# Patient Record
Sex: FEMALE | Race: OTHER | HISPANIC OR LATINO
[De-identification: names, ages, dates, MRNs, and addresses within clinical notes are randomized per-mention and may not be internally consistent; named-entity substitution may affect disease eponyms.]

---

## 2019-02-13 ENCOUNTER — APPOINTMENT (OUTPATIENT)
Dept: UROLOGY | Facility: CLINIC | Age: 59
End: 2019-02-13
Payer: MEDICAID

## 2019-02-13 VITALS
BODY MASS INDEX: 32.49 KG/M2 | TEMPERATURE: 98.6 F | HEIGHT: 65 IN | DIASTOLIC BLOOD PRESSURE: 70 MMHG | OXYGEN SATURATION: 99 % | SYSTOLIC BLOOD PRESSURE: 150 MMHG | WEIGHT: 195 LBS | HEART RATE: 85 BPM

## 2019-02-13 DIAGNOSIS — D35.02 BENIGN NEOPLASM OF LEFT ADRENAL GLAND: ICD-10-CM

## 2019-02-13 DIAGNOSIS — Z86.79 PERSONAL HISTORY OF OTHER DISEASES OF THE CIRCULATORY SYSTEM: ICD-10-CM

## 2019-02-13 DIAGNOSIS — N28.89 OTHER SPECIFIED DISORDERS OF KIDNEY AND URETER: ICD-10-CM

## 2019-02-13 DIAGNOSIS — Z86.39 PERSONAL HISTORY OF OTHER ENDOCRINE, NUTRITIONAL AND METABOLIC DISEASE: ICD-10-CM

## 2019-02-13 PROBLEM — Z00.00 ENCOUNTER FOR PREVENTIVE HEALTH EXAMINATION: Status: ACTIVE | Noted: 2019-02-13

## 2019-02-13 PROCEDURE — 99205 OFFICE O/P NEW HI 60 MIN: CPT | Mod: 57

## 2019-02-13 RX ORDER — OMEGA-3/DHA/EPA/FISH OIL 300-1000MG
1000 CAPSULE ORAL
Qty: 60 | Refills: 0 | Status: ACTIVE | COMMUNITY
Start: 2018-09-19

## 2019-02-13 RX ORDER — DORZOLAMIDE HYDROCHLORIDE TIMOLOL MALEATE 20; 5 MG/ML; MG/ML
22.3-6.8 SOLUTION/ DROPS OPHTHALMIC
Qty: 10 | Refills: 0 | Status: ACTIVE | COMMUNITY
Start: 2018-09-07

## 2019-02-13 RX ORDER — DILTIAZEM HYDROCHLORIDE 360 MG/1
360 CAPSULE, EXTENDED RELEASE ORAL
Qty: 30 | Refills: 0 | Status: ACTIVE | COMMUNITY
Start: 2018-09-03

## 2019-02-13 RX ORDER — NORMAL SALT TABLETS 1 G/G
1 TABLET ORAL
Qty: 9 | Refills: 0 | Status: ACTIVE | COMMUNITY
Start: 2018-12-18

## 2019-02-13 RX ORDER — IBUPROFEN 600 MG/1
600 TABLET, FILM COATED ORAL
Qty: 21 | Refills: 0 | Status: ACTIVE | COMMUNITY
Start: 2018-12-03

## 2019-02-13 RX ORDER — SIMVASTATIN 10 MG/1
10 TABLET, FILM COATED ORAL
Qty: 30 | Refills: 0 | Status: ACTIVE | COMMUNITY
Start: 2018-09-03

## 2019-02-13 RX ORDER — AMOXICILLIN 500 MG/1
500 CAPSULE ORAL
Qty: 21 | Refills: 0 | Status: ACTIVE | COMMUNITY
Start: 2018-12-03

## 2019-02-13 RX ORDER — FLUTICASONE PROPIONATE 110 UG/1
110 AEROSOL, METERED RESPIRATORY (INHALATION)
Qty: 12 | Refills: 0 | Status: ACTIVE | COMMUNITY
Start: 2019-02-02

## 2019-02-13 RX ORDER — BRIMONIDINE TARTRATE 2 MG/MG
0.2 SOLUTION/ DROPS OPHTHALMIC
Qty: 5 | Refills: 0 | Status: ACTIVE | COMMUNITY
Start: 2018-09-07

## 2019-02-13 RX ORDER — METFORMIN HYDROCHLORIDE 500 MG/1
500 TABLET, COATED ORAL
Qty: 60 | Refills: 0 | Status: ACTIVE | COMMUNITY
Start: 2019-02-02

## 2019-02-13 RX ORDER — LATANOPROST/PF 0.005 %
0.01 DROPS OPHTHALMIC (EYE)
Qty: 2 | Refills: 0 | Status: ACTIVE | COMMUNITY
Start: 2018-09-19

## 2019-02-13 RX ORDER — ERGOCALCIFEROL 1.25 MG/1
1.25 MG CAPSULE, LIQUID FILLED ORAL
Qty: 4 | Refills: 0 | Status: ACTIVE | COMMUNITY
Start: 2018-09-03

## 2019-02-13 RX ORDER — DEXAMETHASONE 1 MG/1
1 TABLET ORAL
Qty: 1 | Refills: 0 | Status: ACTIVE | COMMUNITY
Start: 2018-11-13

## 2019-02-13 RX ORDER — SIMVASTATIN 20 MG/1
20 TABLET, FILM COATED ORAL
Qty: 30 | Refills: 0 | Status: ACTIVE | COMMUNITY
Start: 2019-01-14

## 2019-02-13 RX ORDER — ALBUTEROL SULFATE 90 UG/1
108 (90 BASE) AEROSOL, METERED RESPIRATORY (INHALATION)
Qty: 18 | Refills: 0 | Status: ACTIVE | COMMUNITY
Start: 2019-02-02

## 2019-02-13 RX ORDER — FUROSEMIDE 20 MG/1
20 TABLET ORAL
Qty: 30 | Refills: 0 | Status: ACTIVE | COMMUNITY
Start: 2018-09-07

## 2019-02-14 PROBLEM — N28.89 LEFT RENAL MASS: Status: ACTIVE | Noted: 2019-02-14

## 2019-02-14 PROBLEM — Z86.39 HISTORY OF HYPERLIPIDEMIA: Status: RESOLVED | Noted: 2019-02-14 | Resolved: 2019-02-14

## 2019-02-14 PROBLEM — Z86.79 HISTORY OF HYPERTENSION: Status: RESOLVED | Noted: 2019-02-14 | Resolved: 2019-02-14

## 2019-02-14 PROBLEM — D35.02 ADRENAL ADENOMA, LEFT: Status: ACTIVE | Noted: 2019-02-14

## 2019-02-14 NOTE — PHYSICAL EXAM
[General Appearance - In No Acute Distress] : no acute distress [Edema] : no peripheral edema [Respiration, Rhythm And Depth] : normal respiratory rhythm and effort [Exaggerated Use Of Accessory Muscles For Inspiration] : no accessory muscle use [Abdomen Soft] : soft [Abdomen Tenderness] : non-tender [Costovertebral Angle Tenderness] : no ~M costovertebral angle tenderness [Normal Station and Gait] : the gait and station were normal for the patient's age [] : no rash [No Focal Deficits] : no focal deficits [Oriented To Time, Place, And Person] : oriented to person, place, and time [Affect] : the affect was normal [Mood] : the mood was normal [Not Anxious] : not anxious [FreeTextEntry1] : BMI = 32

## 2019-02-14 NOTE — ASSESSMENT
[FreeTextEntry1] : 59yo female with left adrenal adenoma and left renal mass\par CT imaging reviewed\par Patient also has non-obstructing right renal stone which can be observed for now\par Options reviewed including observation of renal mass, deferred evaluation including biopsy, or partial vs radical nephrectomy\par She is interested in partial nephrectomy at time of adrenalectomy\par Risks of surgery including transfusion, urinoma, conversion to open, conversion to radical nephrectomy discussed\par Will review outside labs and staging workup by Dr. Bates

## 2019-02-14 NOTE — HISTORY OF PRESENT ILLNESS
[None] : no symptoms [Lower Back] : lower back [FreeTextEntry1] : This is a 57yo female referred by Dr. Bates for incidental left renal mass. This was found during workup of a 5cm left adrenal mass. The adrenal mass is reportedly non-functioning but she is scheduled for robotic adrenalectomy next week due to the large size. Also noted to have 3cm enhancing left renal mass. Here to discuss possible treatment of this mass at the time of adrenalectomy. No history of smoking or hematuria.

## 2019-02-14 NOTE — LETTER BODY
[Dear  ___] : Dear  [unfilled], [Courtesy Letter:] : I had the pleasure of seeing your patient, [unfilled], in my office today. [Please see my note below.] : Please see my note below. [Consult Closing:] : Thank you very much for allowing me to participate in the care of this patient.  If you have any questions, please do not hesitate to contact me. [Sincerely,] : Sincerely, [FreeTextEntry2] : Neville Martin MD\par [address]\[place] [address] [FreeTextEntry3] : Donis Powell MD\par Urologic Oncology\par Department of Urology\par [facility]\par \par [employer] [facility]  [DrShaye  ___] : Dr. HELMS

## 2019-02-18 VITALS
RESPIRATION RATE: 16 BRPM | HEIGHT: 60 IN | DIASTOLIC BLOOD PRESSURE: 68 MMHG | WEIGHT: 197.75 LBS | HEART RATE: 81 BPM | SYSTOLIC BLOOD PRESSURE: 139 MMHG | OXYGEN SATURATION: 98 % | TEMPERATURE: 98 F

## 2019-03-05 ENCOUNTER — INPATIENT (INPATIENT)
Facility: HOSPITAL | Age: 59
LOS: 3 days | Discharge: HOME HEALTH | DRG: 658 | End: 2019-03-09
Attending: UROLOGY | Admitting: UROLOGY
Payer: COMMERCIAL

## 2019-03-05 ENCOUNTER — RESULT REVIEW (OUTPATIENT)
Age: 59
End: 2019-03-05

## 2019-03-05 DIAGNOSIS — Z98.891 HISTORY OF UTERINE SCAR FROM PREVIOUS SURGERY: Chronic | ICD-10-CM

## 2019-03-05 DIAGNOSIS — Z90.711 ACQUIRED ABSENCE OF UTERUS WITH REMAINING CERVICAL STUMP: Chronic | ICD-10-CM

## 2019-03-05 DIAGNOSIS — Z98.890 OTHER SPECIFIED POSTPROCEDURAL STATES: Chronic | ICD-10-CM

## 2019-03-05 LAB
ANION GAP SERPL CALC-SCNC: 13 MMOL/L — SIGNIFICANT CHANGE UP (ref 5–17)
ANION GAP SERPL CALC-SCNC: 13 MMOL/L — SIGNIFICANT CHANGE UP (ref 5–17)
BASE EXCESS BLDA CALC-SCNC: -5.1 MMOL/L — LOW (ref -2–3)
BUN SERPL-MCNC: 17 MG/DL — SIGNIFICANT CHANGE UP (ref 7–23)
BUN SERPL-MCNC: 19 MG/DL — SIGNIFICANT CHANGE UP (ref 7–23)
CA-I BLDA-SCNC: 1.09 MMOL/L — LOW (ref 1.12–1.3)
CALCIUM SERPL-MCNC: 8.7 MG/DL — SIGNIFICANT CHANGE UP (ref 8.4–10.5)
CALCIUM SERPL-MCNC: 9.2 MG/DL — SIGNIFICANT CHANGE UP (ref 8.4–10.5)
CHLORIDE SERPL-SCNC: 101 MMOL/L — SIGNIFICANT CHANGE UP (ref 96–108)
CHLORIDE SERPL-SCNC: 103 MMOL/L — SIGNIFICANT CHANGE UP (ref 96–108)
CO2 SERPL-SCNC: 24 MMOL/L — SIGNIFICANT CHANGE UP (ref 22–31)
CO2 SERPL-SCNC: 26 MMOL/L — SIGNIFICANT CHANGE UP (ref 22–31)
COHGB MFR BLDA: 0 % — SIGNIFICANT CHANGE UP
CREAT SERPL-MCNC: 0.69 MG/DL — SIGNIFICANT CHANGE UP (ref 0.5–1.3)
CREAT SERPL-MCNC: 0.8 MG/DL — SIGNIFICANT CHANGE UP (ref 0.5–1.3)
GLUCOSE BLDC GLUCOMTR-MCNC: 106 MG/DL — HIGH (ref 70–99)
GLUCOSE BLDC GLUCOMTR-MCNC: 145 MG/DL — HIGH (ref 70–99)
GLUCOSE BLDC GLUCOMTR-MCNC: 154 MG/DL — HIGH (ref 70–99)
GLUCOSE BLDC GLUCOMTR-MCNC: 164 MG/DL — HIGH (ref 70–99)
GLUCOSE SERPL-MCNC: 169 MG/DL — HIGH (ref 70–99)
GLUCOSE SERPL-MCNC: 173 MG/DL — HIGH (ref 70–99)
HCO3 BLDA-SCNC: 20 MMOL/L — LOW (ref 21–28)
HCT VFR BLD CALC: 31.7 % — LOW (ref 34.5–45)
HGB BLD-MCNC: 10.2 G/DL — LOW (ref 11.5–15.5)
HGB BLDA-MCNC: 10.1 G/DL — LOW (ref 11.5–15.5)
MAGNESIUM SERPL-MCNC: 1.7 MG/DL — SIGNIFICANT CHANGE UP (ref 1.6–2.6)
MAGNESIUM SERPL-MCNC: 1.8 MG/DL — SIGNIFICANT CHANGE UP (ref 1.6–2.6)
MCHC RBC-ENTMCNC: 28.7 PG — SIGNIFICANT CHANGE UP (ref 27–34)
MCHC RBC-ENTMCNC: 32.2 GM/DL — SIGNIFICANT CHANGE UP (ref 32–36)
MCV RBC AUTO: 89 FL — SIGNIFICANT CHANGE UP (ref 80–100)
METHGB MFR BLDA: 0.4 % — SIGNIFICANT CHANGE UP
NRBC # BLD: 0 /100 WBCS — SIGNIFICANT CHANGE UP (ref 0–0)
O2 CT VFR BLDA CALC: 14.4 ML/DL — SIGNIFICANT CHANGE UP (ref 15–23)
OXYHGB MFR BLDA: 98 % — SIGNIFICANT CHANGE UP (ref 94–100)
PCO2 BLDA: 40 MMHG — SIGNIFICANT CHANGE UP (ref 32–45)
PH BLDA: 7.32 — LOW (ref 7.35–7.45)
PHOSPHATE SERPL-MCNC: 5.2 MG/DL — HIGH (ref 2.5–4.5)
PHOSPHATE SERPL-MCNC: 6.5 MG/DL — HIGH (ref 2.5–4.5)
PLATELET # BLD AUTO: 290 K/UL — SIGNIFICANT CHANGE UP (ref 150–400)
PO2 BLDA: 178 MMHG — HIGH (ref 83–108)
POTASSIUM BLDA-SCNC: 4.2 MMOL/L — SIGNIFICANT CHANGE UP (ref 3.5–4.9)
POTASSIUM SERPL-MCNC: 4.5 MMOL/L — SIGNIFICANT CHANGE UP (ref 3.5–5.3)
POTASSIUM SERPL-MCNC: SIGNIFICANT CHANGE UP MMOL/L (ref 3.5–5.3)
POTASSIUM SERPL-SCNC: 4.5 MMOL/L — SIGNIFICANT CHANGE UP (ref 3.5–5.3)
POTASSIUM SERPL-SCNC: SIGNIFICANT CHANGE UP MMOL/L (ref 3.5–5.3)
RBC # BLD: 3.56 M/UL — LOW (ref 3.8–5.2)
RBC # FLD: 14.6 % — HIGH (ref 10.3–14.5)
SAO2 % BLDA: 99 % — SIGNIFICANT CHANGE UP (ref 95–100)
SODIUM BLDA-SCNC: 137 MMOL/L — LOW (ref 138–146)
SODIUM SERPL-SCNC: 140 MMOL/L — SIGNIFICANT CHANGE UP (ref 135–145)
SODIUM SERPL-SCNC: 140 MMOL/L — SIGNIFICANT CHANGE UP (ref 135–145)
WBC # BLD: 27.96 K/UL — HIGH (ref 3.8–10.5)
WBC # FLD AUTO: 27.96 K/UL — HIGH (ref 3.8–10.5)

## 2019-03-05 PROCEDURE — 76998 US GUIDE INTRAOP: CPT | Mod: 26

## 2019-03-05 PROCEDURE — 71045 X-RAY EXAM CHEST 1 VIEW: CPT | Mod: 26

## 2019-03-05 PROCEDURE — 50543 LAPARO PARTIAL NEPHRECTOMY: CPT | Mod: LT

## 2019-03-05 PROCEDURE — S2900 ROBOTIC SURGICAL SYSTEM: CPT

## 2019-03-05 RX ORDER — CEFAZOLIN SODIUM 1 G
2000 VIAL (EA) INJECTION EVERY 8 HOURS
Qty: 0 | Refills: 0 | Status: DISCONTINUED | OUTPATIENT
Start: 2019-03-05 | End: 2019-03-05

## 2019-03-05 RX ORDER — HEPARIN SODIUM 5000 [USP'U]/ML
7500 INJECTION INTRAVENOUS; SUBCUTANEOUS EVERY 8 HOURS
Qty: 0 | Refills: 0 | Status: DISCONTINUED | OUTPATIENT
Start: 2019-03-05 | End: 2019-03-09

## 2019-03-05 RX ORDER — INSULIN LISPRO 100/ML
VIAL (ML) SUBCUTANEOUS
Qty: 0 | Refills: 0 | Status: DISCONTINUED | OUTPATIENT
Start: 2019-03-05 | End: 2019-03-09

## 2019-03-05 RX ORDER — SIMVASTATIN 20 MG/1
1 TABLET, FILM COATED ORAL
Qty: 0 | Refills: 0 | COMMUNITY

## 2019-03-05 RX ORDER — METOCLOPRAMIDE HCL 10 MG
10 TABLET ORAL ONCE
Qty: 0 | Refills: 0 | Status: COMPLETED | OUTPATIENT
Start: 2019-03-05 | End: 2019-03-05

## 2019-03-05 RX ORDER — SIMVASTATIN 20 MG/1
20 TABLET, FILM COATED ORAL AT BEDTIME
Qty: 0 | Refills: 0 | Status: DISCONTINUED | OUTPATIENT
Start: 2019-03-05 | End: 2019-03-09

## 2019-03-05 RX ORDER — LATANOPROST 0.05 MG/ML
1 SOLUTION/ DROPS OPHTHALMIC; TOPICAL AT BEDTIME
Qty: 0 | Refills: 0 | Status: DISCONTINUED | OUTPATIENT
Start: 2019-03-05 | End: 2019-03-09

## 2019-03-05 RX ORDER — ONDANSETRON 8 MG/1
4 TABLET, FILM COATED ORAL EVERY 6 HOURS
Qty: 0 | Refills: 0 | Status: DISCONTINUED | OUTPATIENT
Start: 2019-03-05 | End: 2019-03-09

## 2019-03-05 RX ORDER — HYDROMORPHONE HYDROCHLORIDE 2 MG/ML
0.5 INJECTION INTRAMUSCULAR; INTRAVENOUS; SUBCUTANEOUS
Qty: 0 | Refills: 0 | Status: DISCONTINUED | OUTPATIENT
Start: 2019-03-05 | End: 2019-03-05

## 2019-03-05 RX ORDER — OXYCODONE AND ACETAMINOPHEN 5; 325 MG/1; MG/1
2 TABLET ORAL EVERY 6 HOURS
Qty: 0 | Refills: 0 | Status: DISCONTINUED | OUTPATIENT
Start: 2019-03-05 | End: 2019-03-09

## 2019-03-05 RX ORDER — DOCUSATE SODIUM 100 MG
100 CAPSULE ORAL THREE TIMES A DAY
Qty: 0 | Refills: 0 | Status: DISCONTINUED | OUTPATIENT
Start: 2019-03-05 | End: 2019-03-09

## 2019-03-05 RX ORDER — ACETAMINOPHEN 500 MG
650 TABLET ORAL EVERY 6 HOURS
Qty: 0 | Refills: 0 | Status: DISCONTINUED | OUTPATIENT
Start: 2019-03-05 | End: 2019-03-09

## 2019-03-05 RX ORDER — SENNA PLUS 8.6 MG/1
2 TABLET ORAL AT BEDTIME
Qty: 0 | Refills: 0 | Status: DISCONTINUED | OUTPATIENT
Start: 2019-03-05 | End: 2019-03-09

## 2019-03-05 RX ORDER — BRIMONIDINE TARTRATE 2 MG/MG
1 SOLUTION/ DROPS OPHTHALMIC THREE TIMES A DAY
Qty: 0 | Refills: 0 | Status: DISCONTINUED | OUTPATIENT
Start: 2019-03-05 | End: 2019-03-09

## 2019-03-05 RX ORDER — BRIMONIDINE TARTRATE 2 MG/MG
1 SOLUTION/ DROPS OPHTHALMIC
Qty: 0 | Refills: 0 | COMMUNITY

## 2019-03-05 RX ORDER — DILTIAZEM HCL 120 MG
360 CAPSULE, EXT RELEASE 24 HR ORAL DAILY
Qty: 0 | Refills: 0 | Status: DISCONTINUED | OUTPATIENT
Start: 2019-03-05 | End: 2019-03-09

## 2019-03-05 RX ORDER — SODIUM CHLORIDE 9 MG/ML
1000 INJECTION INTRAMUSCULAR; INTRAVENOUS; SUBCUTANEOUS
Qty: 0 | Refills: 0 | Status: DISCONTINUED | OUTPATIENT
Start: 2019-03-05 | End: 2019-03-07

## 2019-03-05 RX ORDER — FUROSEMIDE 40 MG
1 TABLET ORAL
Qty: 0 | Refills: 0 | COMMUNITY

## 2019-03-05 RX ORDER — DILTIAZEM HCL 120 MG
1 CAPSULE, EXT RELEASE 24 HR ORAL
Qty: 0 | Refills: 0 | COMMUNITY

## 2019-03-05 RX ORDER — MAGNESIUM SULFATE 500 MG/ML
1 VIAL (ML) INJECTION ONCE
Qty: 0 | Refills: 0 | Status: COMPLETED | OUTPATIENT
Start: 2019-03-05 | End: 2019-03-05

## 2019-03-05 RX ORDER — CEFAZOLIN SODIUM 1 G
2000 VIAL (EA) INJECTION EVERY 8 HOURS
Qty: 0 | Refills: 0 | Status: COMPLETED | OUTPATIENT
Start: 2019-03-05 | End: 2019-03-06

## 2019-03-05 RX ORDER — OXYCODONE AND ACETAMINOPHEN 5; 325 MG/1; MG/1
1 TABLET ORAL EVERY 4 HOURS
Qty: 0 | Refills: 0 | Status: DISCONTINUED | OUTPATIENT
Start: 2019-03-05 | End: 2019-03-09

## 2019-03-05 RX ADMIN — Medication 10 MILLIGRAM(S): at 18:30

## 2019-03-05 RX ADMIN — Medication 2: at 22:15

## 2019-03-05 RX ADMIN — Medication 360 MILLIGRAM(S): at 18:30

## 2019-03-05 RX ADMIN — Medication 100 GRAM(S): at 18:24

## 2019-03-05 RX ADMIN — HYDROMORPHONE HYDROCHLORIDE 0.5 MILLIGRAM(S): 2 INJECTION INTRAMUSCULAR; INTRAVENOUS; SUBCUTANEOUS at 17:31

## 2019-03-05 RX ADMIN — SIMVASTATIN 20 MILLIGRAM(S): 20 TABLET, FILM COATED ORAL at 22:08

## 2019-03-05 RX ADMIN — HYDROMORPHONE HYDROCHLORIDE 0.5 MILLIGRAM(S): 2 INJECTION INTRAMUSCULAR; INTRAVENOUS; SUBCUTANEOUS at 21:32

## 2019-03-05 RX ADMIN — HEPARIN SODIUM 7500 UNIT(S): 5000 INJECTION INTRAVENOUS; SUBCUTANEOUS at 18:30

## 2019-03-05 RX ADMIN — BRIMONIDINE TARTRATE 1 DROP(S): 2 SOLUTION/ DROPS OPHTHALMIC at 19:37

## 2019-03-05 RX ADMIN — Medication 100 MILLIGRAM(S): at 18:37

## 2019-03-05 RX ADMIN — Medication 2000 MILLIGRAM(S): at 15:12

## 2019-03-05 RX ADMIN — Medication 2000 MILLIGRAM(S): at 22:07

## 2019-03-05 NOTE — H&P ADULT - PMH
Asthma  no episodes in 7 months; allergy related  Glaucoma    HLD (hyperlipidemia)    HTN (hypertension)    Prediabetes

## 2019-03-05 NOTE — BRIEF OPERATIVE NOTE - PROCEDURE
<<-----Click on this checkbox to enter Procedure Adrenalectomy, laparoscopic, robot-assisted  03/05/2019  Left  Active  [facility]

## 2019-03-05 NOTE — BRIEF OPERATIVE NOTE - PROCEDURE
<<-----Click on this checkbox to enter Procedure Nephrectomy, partial, robot-assisted, laparoscopic  03/05/2019  Left adrenalectomy  Active  [facility]

## 2019-03-05 NOTE — H&P ADULT - ASSESSMENT
58F hx DM with left renal mass and adrenal mass here for robotic left partial nephrectomy or adrenalectomy  -OR for above procedure  -Post-op Care

## 2019-03-05 NOTE — PROGRESS NOTE ADULT - SUBJECTIVE AND OBJECTIVE BOX
POST OP NOTE:    Patient states has some abdominal pain which is controlled with medications,  patient also c/o nausea, denies vomiting. Denies fever or chills, denies SOB or CP.       03-05-19 @ 07:01  -  03-05-19 @ 16:18  --------------------------------------------------------  IN: 0 mL / OUT: 260 mL / NET: -260 mL      T(C): 36.3 (03-05-19 @ 13:27), Max: 36.3 (03-05-19 @ 13:27)  HR: 82 (03-05-19 @ 15:57) (76 - 86)  BP: 108/76 (03-05-19 @ 15:57) (105/70 - 132/60)  RR: 11 (03-05-19 @ 15:57) (8 - 13)  SpO2: 98% (03-05-19 @ 15:57) (86% - 98%)    GEN: NAD  ABD: Softly distended, appropriate TTP, Incisions c/d/i, SANTIAGO with bloody drainage  : johnson catheter draining clear urine        A/P 57 yo f with left renal mass s/p left partial nephrectomy left adrenalectomy  1) Advance to clear diet  2) cont IVF until tolerating PO  3) monitor santiago and johnson output  4) f/u post op labs  5) sub q hep and scds

## 2019-03-05 NOTE — H&P ADULT - PSH
H/O right breast biopsy  benign  H/O:  section  x2  History of partial hysterectomy  right oophorectomy  History of surgery  vaginal resection of lesions/lumps

## 2019-03-05 NOTE — BRIEF OPERATIVE NOTE - OPERATION/FINDINGS
Dr. Gonzalez Precise at bedside; pt sitting up on side of bed for epidural.
Patient with >4cm nonfunctional left adrenal adenoma, also with left kidney mass.     Entry gained with john cutdown in LLQ after positioning patient in R lat decub. Colon mobilized medially and then dissection carried to the Left ureter which was followed proximally to the kidney where the renal vein and artery were identified. Next, gerota's fascia was incised and dissected off the kidney. US was used to identify the lesion. After gaining arterial control with bulldogs, the kidney mass was resected with robotic scisors and v-loc sutures were used to closed the defect and gain hemostasis. Gerota's was closed partially over resection. Next, attention was turned to the adrenal gland and the adrenal vein was identified and taken with vessel sealer.
See dictation

## 2019-03-05 NOTE — BRIEF OPERATIVE NOTE - POST-OP DX
Adrenal mass, left  03/05/2019    Active  Vaibhav Beavers  Kidney mass  03/05/2019  Left  Active  Vaibhav Beavers

## 2019-03-05 NOTE — PROGRESS NOTE ADULT - SUBJECTIVE AND OBJECTIVE BOX
Procedure: Left laparoscopic adrenalectomy   Surgeon: Marni    S: Pt has no complaints. Denies CP, SOB, QIU, calf tenderness. Pain controlled with medication.    O:  T(C): 36.3 (03-05-19 @ 13:27), Max: 36.3 (03-05-19 @ 13:27)  T(F): 97.3 (03-05-19 @ 13:27), Max: 97.3 (03-05-19 @ 13:27)  HR: 82 (03-05-19 @ 15:57) (76 - 82)  BP: 108/76 (03-05-19 @ 15:57) (105/70 - 132/60)  RR: 11 (03-05-19 @ 15:57) (8 - 13)  SpO2: 98% (03-05-19 @ 15:57) (86% - 98%)  Wt(kg): --                        10.2   27.96 )-----------( 290      ( 05 Mar 2019 16:14 )             31.7     03-05    140  |  101  |  19  ----------------------------<  169<H>  4.5   |  26  |  0.80    Ca    9.2      05 Mar 2019 16:13  Phos  5.2     03-05  Mg     1.8     03-05        Gen: NAD, resting comfortably in bed  C/V: NSR  Pulm: Nonlabored breathing, no respiratory distress  Abd: soft, NT/ND Incision: CDI, SANTIAGO intact SSx1  Extrem: WWP, no calf edema, SCDs in place      A/P: 58yFemale s/p above adrenalectomy   Diet: CLD  IVF: NS  Pain/nausea control  DVT ppx: SQH 7500 units, SCDs  Perry to gravity  santiago output

## 2019-03-06 DIAGNOSIS — N28.89 OTHER SPECIFIED DISORDERS OF KIDNEY AND URETER: ICD-10-CM

## 2019-03-06 LAB
ANION GAP SERPL CALC-SCNC: 10 MMOL/L — SIGNIFICANT CHANGE UP (ref 5–17)
BASOPHILS # BLD AUTO: 0 K/UL — SIGNIFICANT CHANGE UP (ref 0–0.2)
BASOPHILS NFR BLD AUTO: 0 % — SIGNIFICANT CHANGE UP (ref 0–2)
BUN SERPL-MCNC: 24 MG/DL — HIGH (ref 7–23)
CALCIUM SERPL-MCNC: 8.6 MG/DL — SIGNIFICANT CHANGE UP (ref 8.4–10.5)
CHLORIDE SERPL-SCNC: 103 MMOL/L — SIGNIFICANT CHANGE UP (ref 96–108)
CO2 SERPL-SCNC: 23 MMOL/L — SIGNIFICANT CHANGE UP (ref 22–31)
CREAT SERPL-MCNC: 0.85 MG/DL — SIGNIFICANT CHANGE UP (ref 0.5–1.3)
EOSINOPHIL # BLD AUTO: 0 K/UL — SIGNIFICANT CHANGE UP (ref 0–0.5)
EOSINOPHIL NFR BLD AUTO: 0 % — SIGNIFICANT CHANGE UP (ref 0–6)
GLUCOSE BLDC GLUCOMTR-MCNC: 141 MG/DL — HIGH (ref 70–99)
GLUCOSE BLDC GLUCOMTR-MCNC: 145 MG/DL — HIGH (ref 70–99)
GLUCOSE BLDC GLUCOMTR-MCNC: 147 MG/DL — HIGH (ref 70–99)
GLUCOSE BLDC GLUCOMTR-MCNC: 147 MG/DL — HIGH (ref 70–99)
GLUCOSE SERPL-MCNC: 140 MG/DL — HIGH (ref 70–99)
HCT VFR BLD CALC: 25.3 % — LOW (ref 34.5–45)
HCT VFR BLD CALC: 26.1 % — LOW (ref 34.5–45)
HCT VFR BLD CALC: 26.6 % — LOW (ref 34.5–45)
HGB BLD-MCNC: 7.9 G/DL — LOW (ref 11.5–15.5)
HGB BLD-MCNC: 8 G/DL — LOW (ref 11.5–15.5)
HGB BLD-MCNC: 8.3 G/DL — LOW (ref 11.5–15.5)
HYPOCHROMIA BLD QL: SLIGHT — SIGNIFICANT CHANGE UP
LYMPHOCYTES # BLD AUTO: 2.04 K/UL — SIGNIFICANT CHANGE UP (ref 1–3.3)
LYMPHOCYTES # BLD AUTO: 8.8 % — LOW (ref 13–44)
MAGNESIUM SERPL-MCNC: 2.2 MG/DL — SIGNIFICANT CHANGE UP (ref 1.6–2.6)
MANUAL SMEAR VERIFICATION: SIGNIFICANT CHANGE UP
MCHC RBC-ENTMCNC: 27.9 PG — SIGNIFICANT CHANGE UP (ref 27–34)
MCHC RBC-ENTMCNC: 28.1 PG — SIGNIFICANT CHANGE UP (ref 27–34)
MCHC RBC-ENTMCNC: 28.1 PG — SIGNIFICANT CHANGE UP (ref 27–34)
MCHC RBC-ENTMCNC: 30.7 GM/DL — LOW (ref 32–36)
MCHC RBC-ENTMCNC: 31.2 GM/DL — LOW (ref 32–36)
MCHC RBC-ENTMCNC: 31.2 GM/DL — LOW (ref 32–36)
MCV RBC AUTO: 90 FL — SIGNIFICANT CHANGE UP (ref 80–100)
MCV RBC AUTO: 90.2 FL — SIGNIFICANT CHANGE UP (ref 80–100)
MCV RBC AUTO: 90.9 FL — SIGNIFICANT CHANGE UP (ref 80–100)
MONOCYTES # BLD AUTO: 0 K/UL — SIGNIFICANT CHANGE UP (ref 0–0.9)
MONOCYTES NFR BLD AUTO: 0 % — LOW (ref 2–14)
NEUTROPHILS # BLD AUTO: 21.09 K/UL — HIGH (ref 1.8–7.4)
NEUTROPHILS NFR BLD AUTO: 91.2 % — HIGH (ref 43–77)
NRBC # BLD: 0 /100 WBCS — SIGNIFICANT CHANGE UP (ref 0–0)
NRBC # BLD: 0 /100 WBCS — SIGNIFICANT CHANGE UP (ref 0–0)
PHOSPHATE SERPL-MCNC: 4 MG/DL — SIGNIFICANT CHANGE UP (ref 2.5–4.5)
PLAT MORPH BLD: ABNORMAL
PLATELET # BLD AUTO: 238 K/UL — SIGNIFICANT CHANGE UP (ref 150–400)
PLATELET # BLD AUTO: 242 K/UL — SIGNIFICANT CHANGE UP (ref 150–400)
PLATELET # BLD AUTO: 243 K/UL — SIGNIFICANT CHANGE UP (ref 150–400)
POLYCHROMASIA BLD QL SMEAR: SLIGHT — SIGNIFICANT CHANGE UP
POTASSIUM SERPL-MCNC: 4.4 MMOL/L — SIGNIFICANT CHANGE UP (ref 3.5–5.3)
POTASSIUM SERPL-SCNC: 4.4 MMOL/L — SIGNIFICANT CHANGE UP (ref 3.5–5.3)
RBC # BLD: 2.81 M/UL — LOW (ref 3.8–5.2)
RBC # BLD: 2.87 M/UL — LOW (ref 3.8–5.2)
RBC # BLD: 2.95 M/UL — LOW (ref 3.8–5.2)
RBC # FLD: 15.1 % — HIGH (ref 10.3–14.5)
RBC # FLD: 15.1 % — HIGH (ref 10.3–14.5)
RBC # FLD: 15.2 % — HIGH (ref 10.3–14.5)
RBC BLD AUTO: ABNORMAL
SODIUM SERPL-SCNC: 136 MMOL/L — SIGNIFICANT CHANGE UP (ref 135–145)
STOMATOCYTES BLD QL SMEAR: SLIGHT — SIGNIFICANT CHANGE UP
WBC # BLD: 23.13 K/UL — HIGH (ref 3.8–10.5)
WBC # BLD: 23.21 K/UL — HIGH (ref 3.8–10.5)
WBC # BLD: 23.45 K/UL — HIGH (ref 3.8–10.5)
WBC # FLD AUTO: 23.13 K/UL — HIGH (ref 3.8–10.5)
WBC # FLD AUTO: 23.21 K/UL — HIGH (ref 3.8–10.5)
WBC # FLD AUTO: 23.45 K/UL — HIGH (ref 3.8–10.5)

## 2019-03-06 RX ADMIN — Medication 100 MILLIGRAM(S): at 22:23

## 2019-03-06 RX ADMIN — OXYCODONE AND ACETAMINOPHEN 1 TABLET(S): 5; 325 TABLET ORAL at 05:52

## 2019-03-06 RX ADMIN — OXYCODONE AND ACETAMINOPHEN 2 TABLET(S): 5; 325 TABLET ORAL at 20:02

## 2019-03-06 RX ADMIN — OXYCODONE AND ACETAMINOPHEN 1 TABLET(S): 5; 325 TABLET ORAL at 15:48

## 2019-03-06 RX ADMIN — ONDANSETRON 4 MILLIGRAM(S): 8 TABLET, FILM COATED ORAL at 09:53

## 2019-03-06 RX ADMIN — OXYCODONE AND ACETAMINOPHEN 2 TABLET(S): 5; 325 TABLET ORAL at 02:01

## 2019-03-06 RX ADMIN — BRIMONIDINE TARTRATE 1 DROP(S): 2 SOLUTION/ DROPS OPHTHALMIC at 14:25

## 2019-03-06 RX ADMIN — HEPARIN SODIUM 7500 UNIT(S): 5000 INJECTION INTRAVENOUS; SUBCUTANEOUS at 09:48

## 2019-03-06 RX ADMIN — BRIMONIDINE TARTRATE 1 DROP(S): 2 SOLUTION/ DROPS OPHTHALMIC at 05:52

## 2019-03-06 RX ADMIN — HEPARIN SODIUM 7500 UNIT(S): 5000 INJECTION INTRAVENOUS; SUBCUTANEOUS at 18:33

## 2019-03-06 RX ADMIN — OXYCODONE AND ACETAMINOPHEN 2 TABLET(S): 5; 325 TABLET ORAL at 21:34

## 2019-03-06 RX ADMIN — Medication 100 MILLIGRAM(S): at 14:24

## 2019-03-06 RX ADMIN — SIMVASTATIN 20 MILLIGRAM(S): 20 TABLET, FILM COATED ORAL at 22:23

## 2019-03-06 RX ADMIN — OXYCODONE AND ACETAMINOPHEN 1 TABLET(S): 5; 325 TABLET ORAL at 14:25

## 2019-03-06 RX ADMIN — OXYCODONE AND ACETAMINOPHEN 1 TABLET(S): 5; 325 TABLET ORAL at 07:22

## 2019-03-06 RX ADMIN — ONDANSETRON 4 MILLIGRAM(S): 8 TABLET, FILM COATED ORAL at 02:01

## 2019-03-06 RX ADMIN — OXYCODONE AND ACETAMINOPHEN 2 TABLET(S): 5; 325 TABLET ORAL at 03:00

## 2019-03-06 RX ADMIN — HEPARIN SODIUM 7500 UNIT(S): 5000 INJECTION INTRAVENOUS; SUBCUTANEOUS at 02:00

## 2019-03-06 RX ADMIN — Medication 100 MILLIGRAM(S): at 05:52

## 2019-03-06 RX ADMIN — Medication 360 MILLIGRAM(S): at 05:51

## 2019-03-06 RX ADMIN — BRIMONIDINE TARTRATE 1 DROP(S): 2 SOLUTION/ DROPS OPHTHALMIC at 22:24

## 2019-03-06 RX ADMIN — Medication 2000 MILLIGRAM(S): at 05:51

## 2019-03-06 NOTE — PROGRESS NOTE ADULT - SUBJECTIVE AND OBJECTIVE BOX
AM Note    No acute events overnight.      Vital Signs Last 24 Hrs  T(C): 36.9 (03-06-19 @ 04:33), Max: 37.1 (03-05-19 @ 18:30)  T(F): 98.4 (03-06-19 @ 04:33), Max: 98.8 (03-05-19 @ 18:30)  HR: 96 (03-06-19 @ 00:25) (76 - 96)  BP: 152/67 (03-06-19 @ 00:25) (105/70 - 152/67)  BP(mean): 96 (03-06-19 @ 00:25) (76 - 96)  RR: 16 (03-06-19 @ 00:25) (8 - 16)  SpO2: 97% (03-06-19 @ 00:25) (86% - 98%)     05 Mar 2019 16:13    140    |  101    |  19     ----------------------------<  169    4.5     |  26     |  0.80     Ca    9.2        05 Mar 2019 16:13  Phos  5.2       05 Mar 2019 16:13  Mg     1.8       05 Mar 2019 16:13                            10.2   27.96 )-----------( 290      ( 05 Mar 2019 16:14 )             31.7         I&O's Summary    05 Mar 2019 07:01  -  06 Mar 2019 06:12  --------------------------------------------------------  IN: 1300 mL / OUT: 927 mL / NET: 373 mL          PHYSICAL EXAM:    GEN: NAD  ABD: soft, ntnd, ALEX in place draining serosanguinous fluid, incisions CDI  : johnson in place draining clear

## 2019-03-06 NOTE — PROGRESS NOTE ADULT - PROBLEM SELECTOR PLAN 1
-johnson, monitor uop  -ALEX, monitor output  -pain meds prn  -cont Ancef  -diet, clears/DM, ISS  -scds, IS  -amb, oob  -cont present care

## 2019-03-07 LAB
ANION GAP SERPL CALC-SCNC: 11 MMOL/L — SIGNIFICANT CHANGE UP (ref 5–17)
BUN SERPL-MCNC: 20 MG/DL — SIGNIFICANT CHANGE UP (ref 7–23)
CALCIUM SERPL-MCNC: 8.5 MG/DL — SIGNIFICANT CHANGE UP (ref 8.4–10.5)
CHLORIDE SERPL-SCNC: 103 MMOL/L — SIGNIFICANT CHANGE UP (ref 96–108)
CO2 SERPL-SCNC: 24 MMOL/L — SIGNIFICANT CHANGE UP (ref 22–31)
CREAT FLD-MCNC: 0.91 MG/DL — SIGNIFICANT CHANGE UP
CREAT SERPL-MCNC: 0.84 MG/DL — SIGNIFICANT CHANGE UP (ref 0.5–1.3)
GLUCOSE BLDC GLUCOMTR-MCNC: 104 MG/DL — HIGH (ref 70–99)
GLUCOSE BLDC GLUCOMTR-MCNC: 116 MG/DL — HIGH (ref 70–99)
GLUCOSE BLDC GLUCOMTR-MCNC: 123 MG/DL — HIGH (ref 70–99)
GLUCOSE BLDC GLUCOMTR-MCNC: 134 MG/DL — HIGH (ref 70–99)
GLUCOSE SERPL-MCNC: 135 MG/DL — HIGH (ref 70–99)
HCT VFR BLD CALC: 21.8 % — LOW (ref 34.5–45)
HCT VFR BLD CALC: 22 % — LOW (ref 34.5–45)
HGB BLD-MCNC: 6.8 G/DL — CRITICAL LOW (ref 11.5–15.5)
HGB BLD-MCNC: 6.8 G/DL — CRITICAL LOW (ref 11.5–15.5)
MAGNESIUM SERPL-MCNC: 2.6 MG/DL — SIGNIFICANT CHANGE UP (ref 1.6–2.6)
MCHC RBC-ENTMCNC: 28.2 PG — SIGNIFICANT CHANGE UP (ref 27–34)
MCHC RBC-ENTMCNC: 28.2 PG — SIGNIFICANT CHANGE UP (ref 27–34)
MCHC RBC-ENTMCNC: 30.9 GM/DL — LOW (ref 32–36)
MCHC RBC-ENTMCNC: 31.2 GM/DL — LOW (ref 32–36)
MCV RBC AUTO: 90.5 FL — SIGNIFICANT CHANGE UP (ref 80–100)
MCV RBC AUTO: 91.3 FL — SIGNIFICANT CHANGE UP (ref 80–100)
NRBC # BLD: 0 /100 WBCS — SIGNIFICANT CHANGE UP (ref 0–0)
NRBC # BLD: 0 /100 WBCS — SIGNIFICANT CHANGE UP (ref 0–0)
PHOSPHATE SERPL-MCNC: 3 MG/DL — SIGNIFICANT CHANGE UP (ref 2.5–4.5)
PLATELET # BLD AUTO: 205 K/UL — SIGNIFICANT CHANGE UP (ref 150–400)
PLATELET # BLD AUTO: 208 K/UL — SIGNIFICANT CHANGE UP (ref 150–400)
POTASSIUM SERPL-MCNC: 4.6 MMOL/L — SIGNIFICANT CHANGE UP (ref 3.5–5.3)
POTASSIUM SERPL-SCNC: 4.6 MMOL/L — SIGNIFICANT CHANGE UP (ref 3.5–5.3)
RBC # BLD: 2.41 M/UL — LOW (ref 3.8–5.2)
RBC # BLD: 2.41 M/UL — LOW (ref 3.8–5.2)
RBC # FLD: 15.3 % — HIGH (ref 10.3–14.5)
RBC # FLD: 15.4 % — HIGH (ref 10.3–14.5)
SODIUM SERPL-SCNC: 138 MMOL/L — SIGNIFICANT CHANGE UP (ref 135–145)
SPECIMEN SOURCE FLD: SIGNIFICANT CHANGE UP
WBC # BLD: 22.11 K/UL — HIGH (ref 3.8–10.5)
WBC # BLD: 22.26 K/UL — HIGH (ref 3.8–10.5)
WBC # FLD AUTO: 22.11 K/UL — HIGH (ref 3.8–10.5)
WBC # FLD AUTO: 22.26 K/UL — HIGH (ref 3.8–10.5)

## 2019-03-07 RX ADMIN — BRIMONIDINE TARTRATE 1 DROP(S): 2 SOLUTION/ DROPS OPHTHALMIC at 05:43

## 2019-03-07 RX ADMIN — SIMVASTATIN 20 MILLIGRAM(S): 20 TABLET, FILM COATED ORAL at 22:54

## 2019-03-07 RX ADMIN — OXYCODONE AND ACETAMINOPHEN 2 TABLET(S): 5; 325 TABLET ORAL at 23:54

## 2019-03-07 RX ADMIN — Medication 100 MILLIGRAM(S): at 05:43

## 2019-03-07 RX ADMIN — HEPARIN SODIUM 7500 UNIT(S): 5000 INJECTION INTRAVENOUS; SUBCUTANEOUS at 02:14

## 2019-03-07 RX ADMIN — OXYCODONE AND ACETAMINOPHEN 2 TABLET(S): 5; 325 TABLET ORAL at 22:54

## 2019-03-07 RX ADMIN — OXYCODONE AND ACETAMINOPHEN 2 TABLET(S): 5; 325 TABLET ORAL at 12:37

## 2019-03-07 RX ADMIN — BRIMONIDINE TARTRATE 1 DROP(S): 2 SOLUTION/ DROPS OPHTHALMIC at 12:39

## 2019-03-07 RX ADMIN — Medication 100 MILLIGRAM(S): at 22:54

## 2019-03-07 RX ADMIN — OXYCODONE AND ACETAMINOPHEN 2 TABLET(S): 5; 325 TABLET ORAL at 13:00

## 2019-03-07 RX ADMIN — LATANOPROST 1 DROP(S): 0.05 SOLUTION/ DROPS OPHTHALMIC; TOPICAL at 22:54

## 2019-03-07 RX ADMIN — Medication 360 MILLIGRAM(S): at 05:43

## 2019-03-07 RX ADMIN — HEPARIN SODIUM 7500 UNIT(S): 5000 INJECTION INTRAVENOUS; SUBCUTANEOUS at 17:19

## 2019-03-07 RX ADMIN — OXYCODONE AND ACETAMINOPHEN 2 TABLET(S): 5; 325 TABLET ORAL at 02:14

## 2019-03-07 RX ADMIN — BRIMONIDINE TARTRATE 1 DROP(S): 2 SOLUTION/ DROPS OPHTHALMIC at 22:55

## 2019-03-07 RX ADMIN — Medication 100 MILLIGRAM(S): at 12:38

## 2019-03-07 RX ADMIN — OXYCODONE AND ACETAMINOPHEN 2 TABLET(S): 5; 325 TABLET ORAL at 03:30

## 2019-03-07 NOTE — PROVIDER CONTACT NOTE (CRITICAL VALUE NOTIFICATION) - BACKGROUND
Pt received 1 unit of PRBC on 8LA after initial critical lab for Hgb at 6.8  Hgb remains unchanged after administration of 1 unit of PRBC

## 2019-03-07 NOTE — PROGRESS NOTE ADULT - SUBJECTIVE AND OBJECTIVE BOX
SUBJECTIVE: Patient feeling well, ambulating, no N/V, no flatus/BM. Patient seen and examined bedside by chief resident.    diltiazem    milliGRAM(s) Oral daily  heparin  Injectable 7500 Unit(s) SubCutaneous every 8 hours      Vital Signs Last 24 Hrs  T(C): 36.3 (07 Mar 2019 05:02), Max: 37.1 (06 Mar 2019 17:16)  T(F): 97.3 (07 Mar 2019 05:02), Max: 98.7 (06 Mar 2019 17:16)  HR: 90 (07 Mar 2019 04:19) (84 - 102)  BP: 111/58 (07 Mar 2019 04:19) (111/58 - 150/67)  BP(mean): 78 (07 Mar 2019 04:19) (78 - 97)  RR: 17 (07 Mar 2019 04:19) (16 - 18)  SpO2: 93% (07 Mar 2019 04:19) (93% - 98%)  I&O's Detail    06 Mar 2019 07:01  -  07 Mar 2019 07:00  --------------------------------------------------------  IN:    sodium chloride 0.9%: 2300 mL  Total IN: 2300 mL    OUT:    Bulb: 120 mL    Voided: 950 mL  Total OUT: 1070 mL    Total NET: 1230 mL          General: NAD, resting comfortably in bed  C/V: NSR  Pulm: Nonlabored breathing, no respiratory distress  Abd: softly distended, nontender, incisions CDI, ALEX SS  Extrem: WWP        LABS:                        7.9    23.21 )-----------( 243      ( 06 Mar 2019 16:56 )             25.3     03-07    138  |  103  |  20  ----------------------------<  135<H>  4.6   |  24  |  0.84    Ca    8.5      07 Mar 2019 06:33  Phos  3.0     03-07  Mg     2.6     03-07

## 2019-03-07 NOTE — PROGRESS NOTE ADULT - PROBLEM SELECTOR PLAN 1
-monitor uop  -ALEX, monitor op  -pain meds prn  -Ancef  -clears diet  -oob, amb  -scds, IS  -continue present care

## 2019-03-07 NOTE — PROGRESS NOTE ADULT - SUBJECTIVE AND OBJECTIVE BOX
AM Note    No acute events overnight.      Vital Signs Last 24 Hrs  T(C): 36.3 (03-07-19 @ 05:02), Max: 37.1 (03-06-19 @ 17:16)  T(F): 97.3 (03-07-19 @ 05:02), Max: 98.7 (03-06-19 @ 17:16)  HR: 90 (03-07-19 @ 04:19) (84 - 102)  BP: 111/58 (03-07-19 @ 04:19) (111/58 - 150/67)  BP(mean): 78 (03-07-19 @ 04:19) (78 - 97)  RR: 17 (03-07-19 @ 04:19) (16 - 18)  SpO2: 93% (03-07-19 @ 04:19) (93% - 98%)     06 Mar 2019 07:36    136    |  103    |  24     ----------------------------<  140    4.4     |  23     |  0.85     Ca    8.6        06 Mar 2019 07:36  Phos  4.0       06 Mar 2019 07:36  Mg     2.2       06 Mar 2019 07:36                            7.9    23.21 )-----------( 243      ( 06 Mar 2019 16:56 )             25.3         I&O's Summary    05 Mar 2019 07:01  -  06 Mar 2019 07:00  --------------------------------------------------------  IN: 1400 mL / OUT: 1367 mL / NET: 33 mL    06 Mar 2019 07:01  -  07 Mar 2019 05:49  --------------------------------------------------------  IN: 2300 mL / OUT: 1070 mL / NET: 1230 mL          PHYSICAL EXAM:    GEN: NAD  ABD: soft, nontender, mild appropriate distention, incision CDI, ALEX in place draining serosang fluid  : urinating without difficulty

## 2019-03-08 LAB
ANION GAP SERPL CALC-SCNC: 9 MMOL/L — SIGNIFICANT CHANGE UP (ref 5–17)
BASOPHILS # BLD AUTO: 0.04 K/UL — SIGNIFICANT CHANGE UP (ref 0–0.2)
BASOPHILS NFR BLD AUTO: 0.2 % — SIGNIFICANT CHANGE UP (ref 0–2)
BUN SERPL-MCNC: 15 MG/DL — SIGNIFICANT CHANGE UP (ref 7–23)
CALCIUM SERPL-MCNC: 8.8 MG/DL — SIGNIFICANT CHANGE UP (ref 8.4–10.5)
CHLORIDE SERPL-SCNC: 107 MMOL/L — SIGNIFICANT CHANGE UP (ref 96–108)
CO2 SERPL-SCNC: 26 MMOL/L — SIGNIFICANT CHANGE UP (ref 22–31)
CREAT SERPL-MCNC: 0.7 MG/DL — SIGNIFICANT CHANGE UP (ref 0.5–1.3)
EOSINOPHIL # BLD AUTO: 0.18 K/UL — SIGNIFICANT CHANGE UP (ref 0–0.5)
EOSINOPHIL NFR BLD AUTO: 1 % — SIGNIFICANT CHANGE UP (ref 0–6)
GLUCOSE BLDC GLUCOMTR-MCNC: 102 MG/DL — HIGH (ref 70–99)
GLUCOSE BLDC GLUCOMTR-MCNC: 108 MG/DL — HIGH (ref 70–99)
GLUCOSE BLDC GLUCOMTR-MCNC: 123 MG/DL — HIGH (ref 70–99)
GLUCOSE BLDC GLUCOMTR-MCNC: 125 MG/DL — HIGH (ref 70–99)
GLUCOSE SERPL-MCNC: 114 MG/DL — HIGH (ref 70–99)
HCT VFR BLD CALC: 22.3 % — LOW (ref 34.5–45)
HCT VFR BLD CALC: 22.7 % — LOW (ref 34.5–45)
HGB BLD-MCNC: 6.9 G/DL — CRITICAL LOW (ref 11.5–15.5)
HGB BLD-MCNC: 7.1 G/DL — LOW (ref 11.5–15.5)
IMM GRANULOCYTES NFR BLD AUTO: 1.2 % — SIGNIFICANT CHANGE UP (ref 0–1.5)
LYMPHOCYTES # BLD AUTO: 16.2 % — SIGNIFICANT CHANGE UP (ref 13–44)
LYMPHOCYTES # BLD AUTO: 2.92 K/UL — SIGNIFICANT CHANGE UP (ref 1–3.3)
MAGNESIUM SERPL-MCNC: 2.4 MG/DL — SIGNIFICANT CHANGE UP (ref 1.6–2.6)
MCHC RBC-ENTMCNC: 27.9 PG — SIGNIFICANT CHANGE UP (ref 27–34)
MCHC RBC-ENTMCNC: 28.5 PG — SIGNIFICANT CHANGE UP (ref 27–34)
MCHC RBC-ENTMCNC: 30.4 GM/DL — LOW (ref 32–36)
MCHC RBC-ENTMCNC: 31.8 GM/DL — LOW (ref 32–36)
MCV RBC AUTO: 89.6 FL — SIGNIFICANT CHANGE UP (ref 80–100)
MCV RBC AUTO: 91.9 FL — SIGNIFICANT CHANGE UP (ref 80–100)
MONOCYTES # BLD AUTO: 1.66 K/UL — HIGH (ref 0–0.9)
MONOCYTES NFR BLD AUTO: 9.2 % — SIGNIFICANT CHANGE UP (ref 2–14)
NEUTROPHILS # BLD AUTO: 13.06 K/UL — HIGH (ref 1.8–7.4)
NEUTROPHILS NFR BLD AUTO: 72.2 % — SIGNIFICANT CHANGE UP (ref 43–77)
NRBC # BLD: 0 /100 WBCS — SIGNIFICANT CHANGE UP (ref 0–0)
NRBC # BLD: 0 /100 WBCS — SIGNIFICANT CHANGE UP (ref 0–0)
PHOSPHATE SERPL-MCNC: 2.8 MG/DL — SIGNIFICANT CHANGE UP (ref 2.5–4.5)
PLATELET # BLD AUTO: 187 K/UL — SIGNIFICANT CHANGE UP (ref 150–400)
PLATELET # BLD AUTO: 218 K/UL — SIGNIFICANT CHANGE UP (ref 150–400)
POTASSIUM SERPL-MCNC: 4.3 MMOL/L — SIGNIFICANT CHANGE UP (ref 3.5–5.3)
POTASSIUM SERPL-SCNC: 4.3 MMOL/L — SIGNIFICANT CHANGE UP (ref 3.5–5.3)
RBC # BLD: 2.47 M/UL — LOW (ref 3.8–5.2)
RBC # BLD: 2.49 M/UL — LOW (ref 3.8–5.2)
RBC # FLD: 15.4 % — HIGH (ref 10.3–14.5)
RBC # FLD: 15.8 % — HIGH (ref 10.3–14.5)
SODIUM SERPL-SCNC: 142 MMOL/L — SIGNIFICANT CHANGE UP (ref 135–145)
SURGICAL PATHOLOGY STUDY: SIGNIFICANT CHANGE UP
WBC # BLD: 18.01 K/UL — HIGH (ref 3.8–10.5)
WBC # BLD: 18.07 K/UL — HIGH (ref 3.8–10.5)
WBC # FLD AUTO: 18.01 K/UL — HIGH (ref 3.8–10.5)
WBC # FLD AUTO: 18.07 K/UL — HIGH (ref 3.8–10.5)

## 2019-03-08 PROCEDURE — 71045 X-RAY EXAM CHEST 1 VIEW: CPT | Mod: 26

## 2019-03-08 RX ORDER — FUROSEMIDE 40 MG
20 TABLET ORAL ONCE
Qty: 0 | Refills: 0 | Status: COMPLETED | OUTPATIENT
Start: 2019-03-08 | End: 2019-03-08

## 2019-03-08 RX ORDER — FUROSEMIDE 40 MG
20 TABLET ORAL DAILY
Qty: 0 | Refills: 0 | Status: DISCONTINUED | OUTPATIENT
Start: 2019-03-08 | End: 2019-03-09

## 2019-03-08 RX ORDER — METOPROLOL TARTRATE 50 MG
5 TABLET ORAL ONCE
Qty: 0 | Refills: 0 | Status: COMPLETED | OUTPATIENT
Start: 2019-03-08 | End: 2019-03-08

## 2019-03-08 RX ORDER — FUROSEMIDE 40 MG
10 TABLET ORAL ONCE
Qty: 0 | Refills: 0 | Status: DISCONTINUED | OUTPATIENT
Start: 2019-03-08 | End: 2019-03-08

## 2019-03-08 RX ADMIN — OXYCODONE AND ACETAMINOPHEN 1 TABLET(S): 5; 325 TABLET ORAL at 10:30

## 2019-03-08 RX ADMIN — LATANOPROST 1 DROP(S): 0.05 SOLUTION/ DROPS OPHTHALMIC; TOPICAL at 22:31

## 2019-03-08 RX ADMIN — HEPARIN SODIUM 7500 UNIT(S): 5000 INJECTION INTRAVENOUS; SUBCUTANEOUS at 03:08

## 2019-03-08 RX ADMIN — Medication 100 MILLIGRAM(S): at 22:32

## 2019-03-08 RX ADMIN — HEPARIN SODIUM 7500 UNIT(S): 5000 INJECTION INTRAVENOUS; SUBCUTANEOUS at 19:10

## 2019-03-08 RX ADMIN — OXYCODONE AND ACETAMINOPHEN 2 TABLET(S): 5; 325 TABLET ORAL at 23:15

## 2019-03-08 RX ADMIN — Medication 10 MILLIGRAM(S): at 09:18

## 2019-03-08 RX ADMIN — Medication 5 MILLIGRAM(S): at 19:45

## 2019-03-08 RX ADMIN — BRIMONIDINE TARTRATE 1 DROP(S): 2 SOLUTION/ DROPS OPHTHALMIC at 22:32

## 2019-03-08 RX ADMIN — BRIMONIDINE TARTRATE 1 DROP(S): 2 SOLUTION/ DROPS OPHTHALMIC at 14:06

## 2019-03-08 RX ADMIN — SIMVASTATIN 20 MILLIGRAM(S): 20 TABLET, FILM COATED ORAL at 22:32

## 2019-03-08 RX ADMIN — HEPARIN SODIUM 7500 UNIT(S): 5000 INJECTION INTRAVENOUS; SUBCUTANEOUS at 10:37

## 2019-03-08 RX ADMIN — OXYCODONE AND ACETAMINOPHEN 2 TABLET(S): 5; 325 TABLET ORAL at 15:30

## 2019-03-08 RX ADMIN — Medication 20 MILLIGRAM(S): at 09:19

## 2019-03-08 RX ADMIN — Medication 360 MILLIGRAM(S): at 06:48

## 2019-03-08 RX ADMIN — Medication 100 MILLIGRAM(S): at 06:48

## 2019-03-08 RX ADMIN — BRIMONIDINE TARTRATE 1 DROP(S): 2 SOLUTION/ DROPS OPHTHALMIC at 06:48

## 2019-03-08 RX ADMIN — OXYCODONE AND ACETAMINOPHEN 1 TABLET(S): 5; 325 TABLET ORAL at 09:18

## 2019-03-08 RX ADMIN — OXYCODONE AND ACETAMINOPHEN 2 TABLET(S): 5; 325 TABLET ORAL at 22:31

## 2019-03-08 RX ADMIN — Medication 20 MILLIGRAM(S): at 19:10

## 2019-03-08 NOTE — DIETITIAN INITIAL EVALUATION ADULT. - ENERGY NEEDS
Height: 5'0" Weight: 198lbs, IBW 100lbs+/-10%, %%, BMI 38.6  IBW used for calculations as pt >120% of IBW   Nutrient needs based on [facility] standards of care for maintenance in adults.   Needs adjusted for post-op healing

## 2019-03-08 NOTE — PROGRESS NOTE ADULT - SUBJECTIVE AND OBJECTIVE BOX
AM Note    No acute events overnight.      Vital Signs Last 24 Hrs  T(C): 36.7 (03-08-19 @ 04:57), Max: 37.4 (03-08-19 @ 00:15)  T(F): 98.1 (03-08-19 @ 04:57), Max: 99.3 (03-08-19 @ 00:15)  HR: 92 (03-08-19 @ 04:20) (88 - 104)  BP: 128/58 (03-08-19 @ 04:20) (122/56 - 149/66)  BP(mean): 84 (03-08-19 @ 04:20) (81 - 96)  RR: 16 (03-08-19 @ 04:20) (16 - 18)  SpO2: 96% (03-08-19 @ 04:20) (92% - 100%)     08 Mar 2019 05:55    142    |  107    |  15     ----------------------------<  114    4.3     |  26     |  0.70     Ca    8.8        08 Mar 2019 05:55  Phos  2.8       08 Mar 2019 05:55  Mg     2.4       08 Mar 2019 05:55                            6.8    22.26 )-----------( 208      ( 07 Mar 2019 18:27 )             21.8         I&O's Summary    06 Mar 2019 07:01  -  07 Mar 2019 07:00  --------------------------------------------------------  IN: 2300 mL / OUT: 1070 mL / NET: 1230 mL    07 Mar 2019 07:01  -  08 Mar 2019 06:50  --------------------------------------------------------  IN: 360 mL / OUT: 1570 mL / NET: -1210 mL          PHYSICAL EXAM:    GEN:  ABD:  NIRU:  AM Note    No acute events overnight.      Vital Signs Last 24 Hrs  T(C): 36.7 (03-08-19 @ 04:57), Max: 37.4 (03-08-19 @ 00:15)  T(F): 98.1 (03-08-19 @ 04:57), Max: 99.3 (03-08-19 @ 00:15)  HR: 92 (03-08-19 @ 04:20) (88 - 104)  BP: 128/58 (03-08-19 @ 04:20) (122/56 - 149/66)  BP(mean): 84 (03-08-19 @ 04:20) (81 - 96)  RR: 16 (03-08-19 @ 04:20) (16 - 18)  SpO2: 96% (03-08-19 @ 04:20) (92% - 100%)     08 Mar 2019 05:55    142    |  107    |  15     ----------------------------<  114    4.3     |  26     |  0.70     Ca    8.8        08 Mar 2019 05:55  Phos  2.8       08 Mar 2019 05:55  Mg     2.4       08 Mar 2019 05:55                            6.8    22.26 )-----------( 208      ( 07 Mar 2019 18:27 )             21.8         I&O's Summary    06 Mar 2019 07:01  -  07 Mar 2019 07:00  --------------------------------------------------------  IN: 2300 mL / OUT: 1070 mL / NET: 1230 mL    07 Mar 2019 07:01  -  08 Mar 2019 06:50  --------------------------------------------------------  IN: 360 mL / OUT: 1570 mL / NET: -1210 mL          PHYSICAL EXAM:    GEN: NAD  ABD: soft, nontender, mild appropriate distention, incision CDI, ALEX in place draining serosang fluid  : urinating without difficulty

## 2019-03-08 NOTE — PROGRESS NOTE ADULT - ATTENDING COMMENTS
No improvement in Hgb could be due to IV infiltration during both transfusions. However, she is currently asymptomatic, ambulating well, tolerating regular diet. Will resume Lasix and recheck Hgb. Possible D/C home later today

## 2019-03-08 NOTE — PROGRESS NOTE ADULT - PROBLEM SELECTOR PLAN 1
-monitor uop  -ALEX, monitor op  -pain meds prn  -Ancef  -clears diet  -oob, amb  -scds, IS  -continue present care.

## 2019-03-08 NOTE — PROGRESS NOTE ADULT - SUBJECTIVE AND OBJECTIVE BOX
Bilateral UE exam within normal limits. 5/5 strength bilaterally, sensation symmetrical and intact bilaterally.

## 2019-03-08 NOTE — PROGRESS NOTE ADULT - SUBJECTIVE AND OBJECTIVE BOX
DAILY PROGRESS NOTE:    S: Patient feeling well, ambulating, -N/V, -flatus/BM.     O:    Vital Signs Last 24 Hrs  T(C): 37.9 (08 Mar 2019 09:03), Max: 37.9 (08 Mar 2019 09:03)  T(F): 100.2 (08 Mar 2019 09:03), Max: 100.2 (08 Mar 2019 09:03)  HR: 102 (08 Mar 2019 09:00) (88 - 104)  BP: 165/65 (08 Mar 2019 09:00) (122/56 - 165/65)  BP(mean): 93 (08 Mar 2019 09:00) (81 - 93)  RR: 16 (08 Mar 2019 09:00) (16 - 18)  SpO2: 93% (08 Mar 2019 09:00) (92% - 100%)    I&O's Detail    07 Mar 2019 07:01  -  08 Mar 2019 07:00  --------------------------------------------------------  IN:    Oral Fluid: 360 mL  Total IN: 360 mL    OUT:    Bulb: 20 mL    Voided: 1750 mL  Total OUT: 1770 mL    Total NET: -1410 mL      08 Mar 2019 07:01  -  08 Mar 2019 09:28  --------------------------------------------------------  IN:    Oral Fluid: 360 mL  Total IN: 360 mL    OUT:  Total OUT: 0 mL    Total NET: 360 mL          Physical Exam:    General: NAD, resting comfortably in bed  C/V: NSR  Pulm: Nonlabored breathing, no respiratory distress  Abd: softly distended, nontender, incisions CDI, ALEX SS  Extrem: [facility]    LABS:                        7.1    18.07 )-----------( 187      ( 08 Mar 2019 05:55 )             22.3     03-08    142  |  107  |  15  ----------------------------<  114<H>  4.3   |  26  |  0.70    Ca    8.8      08 Mar 2019 05:55  Phos  2.8     03-08  Mg     2.4     03-08            RADIOLOGY & ADDITIONAL STUDIES:

## 2019-03-08 NOTE — DIETITIAN INITIAL EVALUATION ADULT. - OTHER INFO
59yo F w/ hx DM with left renal mass and adrenal mass s/p robotic left partial nephrectomy and lap robotic assisted adrenalectomy. Pt seen resting in bed. Currently on a regular diet and tolerating PO. Endorsing good appetite, consuming >75% meals. Denies GI distress, no N/V. Has yet to have a BM, on bowel regimen. Denies adhering to a particular diet at home. Noted, pt w/ h/o of diabetes, please obtain A1C. Denies adhering to a particular diet at home, no restrictions/modifcations made. Discussed increased needs post-op. Pt receptive and expressed understanding. Shellfish allergy confirmed. Skin: surgical incision; GI WDL per flowsheet.

## 2019-03-08 NOTE — DIETITIAN INITIAL EVALUATION ADULT. - NS AS NUTRI INTERV MEALS SNACK
Recommend switching over to CSTCHO diet as tolerated. Encourage intake at meals./General/healthful diet

## 2019-03-09 ENCOUNTER — TRANSCRIPTION ENCOUNTER (OUTPATIENT)
Age: 59
End: 2019-03-09

## 2019-03-09 VITALS — TEMPERATURE: 99 F

## 2019-03-09 LAB
ANION GAP SERPL CALC-SCNC: 9 MMOL/L — SIGNIFICANT CHANGE UP (ref 5–17)
BLD GP AB SCN SERPL QL: NEGATIVE — SIGNIFICANT CHANGE UP
BUN SERPL-MCNC: 15 MG/DL — SIGNIFICANT CHANGE UP (ref 7–23)
CALCIUM SERPL-MCNC: 9 MG/DL — SIGNIFICANT CHANGE UP (ref 8.4–10.5)
CHLORIDE SERPL-SCNC: 102 MMOL/L — SIGNIFICANT CHANGE UP (ref 96–108)
CO2 SERPL-SCNC: 29 MMOL/L — SIGNIFICANT CHANGE UP (ref 22–31)
CREAT SERPL-MCNC: 0.65 MG/DL — SIGNIFICANT CHANGE UP (ref 0.5–1.3)
GLUCOSE BLDC GLUCOMTR-MCNC: 113 MG/DL — HIGH (ref 70–99)
GLUCOSE BLDC GLUCOMTR-MCNC: 126 MG/DL — HIGH (ref 70–99)
GLUCOSE BLDC GLUCOMTR-MCNC: 168 MG/DL — HIGH (ref 70–99)
GLUCOSE SERPL-MCNC: 103 MG/DL — HIGH (ref 70–99)
HCT VFR BLD CALC: 26.3 % — LOW (ref 34.5–45)
HGB BLD-MCNC: 8.5 G/DL — LOW (ref 11.5–15.5)
MAGNESIUM SERPL-MCNC: 2.2 MG/DL — SIGNIFICANT CHANGE UP (ref 1.6–2.6)
MCHC RBC-ENTMCNC: 28.8 PG — SIGNIFICANT CHANGE UP (ref 27–34)
MCHC RBC-ENTMCNC: 32.3 GM/DL — SIGNIFICANT CHANGE UP (ref 32–36)
MCV RBC AUTO: 89.2 FL — SIGNIFICANT CHANGE UP (ref 80–100)
NRBC # BLD: 0 /100 WBCS — SIGNIFICANT CHANGE UP (ref 0–0)
PHOSPHATE SERPL-MCNC: 2.9 MG/DL — SIGNIFICANT CHANGE UP (ref 2.5–4.5)
PLATELET # BLD AUTO: 221 K/UL — SIGNIFICANT CHANGE UP (ref 150–400)
POTASSIUM SERPL-MCNC: 4.1 MMOL/L — SIGNIFICANT CHANGE UP (ref 3.5–5.3)
POTASSIUM SERPL-SCNC: 4.1 MMOL/L — SIGNIFICANT CHANGE UP (ref 3.5–5.3)
RBC # BLD: 2.95 M/UL — LOW (ref 3.8–5.2)
RBC # FLD: 15.1 % — HIGH (ref 10.3–14.5)
RH IG SCN BLD-IMP: POSITIVE — SIGNIFICANT CHANGE UP
SODIUM SERPL-SCNC: 140 MMOL/L — SIGNIFICANT CHANGE UP (ref 135–145)
WBC # BLD: 16.02 K/UL — HIGH (ref 3.8–10.5)
WBC # FLD AUTO: 16.02 K/UL — HIGH (ref 3.8–10.5)

## 2019-03-09 PROCEDURE — 82962 GLUCOSE BLOOD TEST: CPT

## 2019-03-09 PROCEDURE — 86850 RBC ANTIBODY SCREEN: CPT

## 2019-03-09 PROCEDURE — 88341 IMHCHEM/IMCYTCHM EA ADD ANTB: CPT

## 2019-03-09 PROCEDURE — 82570 ASSAY OF URINE CREATININE: CPT

## 2019-03-09 PROCEDURE — 71045 X-RAY EXAM CHEST 1 VIEW: CPT

## 2019-03-09 PROCEDURE — 82330 ASSAY OF CALCIUM: CPT

## 2019-03-09 PROCEDURE — 84295 ASSAY OF SERUM SODIUM: CPT

## 2019-03-09 PROCEDURE — 86900 BLOOD TYPING SEROLOGIC ABO: CPT

## 2019-03-09 PROCEDURE — 85025 COMPLETE CBC W/AUTO DIFF WBC: CPT

## 2019-03-09 PROCEDURE — 88331 PATH CONSLTJ SURG 1 BLK 1SPC: CPT

## 2019-03-09 PROCEDURE — 86923 COMPATIBILITY TEST ELECTRIC: CPT

## 2019-03-09 PROCEDURE — 85027 COMPLETE CBC AUTOMATED: CPT

## 2019-03-09 PROCEDURE — 85018 HEMOGLOBIN: CPT

## 2019-03-09 PROCEDURE — C1889: CPT

## 2019-03-09 PROCEDURE — 36415 COLL VENOUS BLD VENIPUNCTURE: CPT

## 2019-03-09 PROCEDURE — 84100 ASSAY OF PHOSPHORUS: CPT

## 2019-03-09 PROCEDURE — 36430 TRANSFUSION BLD/BLD COMPNT: CPT

## 2019-03-09 PROCEDURE — 80048 BASIC METABOLIC PNL TOTAL CA: CPT

## 2019-03-09 PROCEDURE — 83735 ASSAY OF MAGNESIUM: CPT

## 2019-03-09 PROCEDURE — P9016: CPT

## 2019-03-09 PROCEDURE — S2900: CPT

## 2019-03-09 PROCEDURE — 84132 ASSAY OF SERUM POTASSIUM: CPT

## 2019-03-09 PROCEDURE — 86901 BLOOD TYPING SEROLOGIC RH(D): CPT

## 2019-03-09 PROCEDURE — 88307 TISSUE EXAM BY PATHOLOGIST: CPT

## 2019-03-09 PROCEDURE — 88305 TISSUE EXAM BY PATHOLOGIST: CPT

## 2019-03-09 PROCEDURE — 88360 TUMOR IMMUNOHISTOCHEM/MANUAL: CPT

## 2019-03-09 RX ORDER — FAMOTIDINE 10 MG/ML
20 INJECTION INTRAVENOUS ONCE
Qty: 0 | Refills: 0 | Status: COMPLETED | OUTPATIENT
Start: 2019-03-09 | End: 2019-03-09

## 2019-03-09 RX ORDER — DOCUSATE SODIUM 100 MG
1 CAPSULE ORAL
Qty: 15 | Refills: 0 | OUTPATIENT
Start: 2019-03-09 | End: 2019-03-13

## 2019-03-09 RX ORDER — DORZOLAMIDE HYDROCHLORIDE TIMOLOL MALEATE 20; 5 MG/ML; MG/ML
1 SOLUTION/ DROPS OPHTHALMIC
Qty: 0 | Refills: 0 | COMMUNITY

## 2019-03-09 RX ORDER — METFORMIN HYDROCHLORIDE 850 MG/1
1 TABLET ORAL
Qty: 0 | Refills: 0 | COMMUNITY

## 2019-03-09 RX ORDER — BIMATOPROST 0.3 MG/ML
1 SOLUTION/ DROPS OPHTHALMIC
Qty: 0 | Refills: 0 | COMMUNITY

## 2019-03-09 RX ADMIN — BRIMONIDINE TARTRATE 1 DROP(S): 2 SOLUTION/ DROPS OPHTHALMIC at 13:43

## 2019-03-09 RX ADMIN — OXYCODONE AND ACETAMINOPHEN 1 TABLET(S): 5; 325 TABLET ORAL at 15:35

## 2019-03-09 RX ADMIN — BRIMONIDINE TARTRATE 1 DROP(S): 2 SOLUTION/ DROPS OPHTHALMIC at 06:02

## 2019-03-09 RX ADMIN — OXYCODONE AND ACETAMINOPHEN 1 TABLET(S): 5; 325 TABLET ORAL at 17:19

## 2019-03-09 RX ADMIN — OXYCODONE AND ACETAMINOPHEN 2 TABLET(S): 5; 325 TABLET ORAL at 07:01

## 2019-03-09 RX ADMIN — Medication 100 MILLIGRAM(S): at 06:02

## 2019-03-09 RX ADMIN — HEPARIN SODIUM 7500 UNIT(S): 5000 INJECTION INTRAVENOUS; SUBCUTANEOUS at 11:50

## 2019-03-09 RX ADMIN — FAMOTIDINE 20 MILLIGRAM(S): 10 INJECTION INTRAVENOUS at 13:43

## 2019-03-09 RX ADMIN — Medication 360 MILLIGRAM(S): at 06:01

## 2019-03-09 RX ADMIN — Medication 20 MILLIGRAM(S): at 06:03

## 2019-03-09 RX ADMIN — HEPARIN SODIUM 7500 UNIT(S): 5000 INJECTION INTRAVENOUS; SUBCUTANEOUS at 02:35

## 2019-03-09 RX ADMIN — OXYCODONE AND ACETAMINOPHEN 2 TABLET(S): 5; 325 TABLET ORAL at 06:01

## 2019-03-09 NOTE — DISCHARGE NOTE PROVIDER - NSDCFUADDINST_GEN_ALL_CORE_FT
Stay well hydrated, you may shower. Keep incisions clean.  No strenuous activity until you speak with Dr Sloan. If you are to develop a fever > 100.4, any nausea, Vomiting, weakness/ Dizziness , Shortness of breath you are to report to the emergency room.

## 2019-03-09 NOTE — PROGRESS NOTE ADULT - SUBJECTIVE AND OBJECTIVE BOX
AM Note    No acute events overnight.        Vital Signs Last 24 Hrs  T(C): 36.7 (03-09-19 @ 04:30), Max: 37.9 (03-08-19 @ 09:03)  T(F): 98.1 (03-09-19 @ 04:30), Max: 100.2 (03-08-19 @ 09:03)  HR: 88 (03-09-19 @ 04:50) (84 - 104)  BP: 137/61 (03-09-19 @ 04:50) (123/58 - 165/65)  BP(mean): 88 (03-09-19 @ 04:50) (83 - 96)  RR: 16 (03-09-19 @ 04:50) (16 - 18)  SpO2: 96% (03-09-19 @ 04:50) (92% - 98%)     09 Mar 2019 05:54    140    |  102    |  15     ----------------------------<  103    4.1     |  29     |  0.65     Ca    9.0        09 Mar 2019 05:54  Phos  2.9       09 Mar 2019 05:54  Mg     2.2       09 Mar 2019 05:54                            8.5    16.02 )-----------( 221      ( 09 Mar 2019 05:56 )             26.3         I&O's Summary    07 Mar 2019 07:01  -  08 Mar 2019 07:00  --------------------------------------------------------  IN: 360 mL / OUT: 1770 mL / NET: -1410 mL    08 Mar 2019 07:01  -  09 Mar 2019 06:38  --------------------------------------------------------  IN: 760 mL / OUT: 450 mL / NET: 310 mL          PHYSICAL EXAM:    GEN: NAD  ABD: soft, mild distention, incision intact  : voiding

## 2019-03-09 NOTE — PROGRESS NOTE ADULT - PROBLEM SELECTOR PLAN 1
-monitor uop  -pain meds prn  -Ancef  -reg diet  -oob, amb  -scds, IS  -continue present care. -monitor uop  -pain meds prn  -Ancef  -reg diet  Discharge home today  -oob, amb  -scds, IS  -continue present care.

## 2019-03-09 NOTE — PROGRESS NOTE ADULT - ASSESSMENT
58F hx DM with left renal mass and adrenal mass s/p robotic left partial nephrectomy and lap robotic assisted adrenalectomy    CLD-- ok to advance to regular  Continue colace  Pain nausea control  OOBA/IS/SCDs/SQH  Ancef  ISS  Home meds as appropriate  Primary management per Urology
58F hx DM with left renal mass and adrenal mass s/p robotic left partial nephrectomy and lap robotic assisted adrenalectomy    CLD/IVF  Pain nasuea control  OOBA/IS/SCDs/SQH  Ancef  ISS  Home meds as appropriate  Primary management per Urology
58F hx DM with left renal mass and adrenal mass s/p robotic left partial nephrectomy and lap robotic assisted adrenalectomy    Regular  Pain nasuea control  OOBA/IS/SCDs/SQH  Ancef  ISS  Home meds as appropriate  Primary management per Urology
58F hx DM with left renal mass and adrenal mass s/p robotic left partial nephrectomy and lap robotic assisted adrenalectomy    Regular diet  Pain nasuea control  OOBA/IS/SCDs/SQH  Ancef  ISS  Home meds as appropriate  Primary management per Urology
Patient is a 57 y/o female with hx of HTN, DM, HLD, Left renal mass s/p scheduled left partial nephrectomy 3/5
Patient is a 59 y/o female with hx of left renal mass s/p scheduled left partial nephrectomy 3/5
Patient is a 59 y/o female with hx of left renal mass s/p scheduled left partial nephrectomy 3/5.  Got 1U PRBC yesterday evening for Hg 6.9, Lasix 20mg.
Patient is a 59 y/o female with hx of left renal mass s/p scheduled left partial nephrectomy 3/5

## 2019-03-09 NOTE — PROGRESS NOTE ADULT - REASON FOR ADMISSION
adrenal and renal mass

## 2019-03-09 NOTE — DISCHARGE NOTE NURSING/CASE MANAGEMENT/SOCIAL WORK - NSDCDPATPORTLINK_GEN_ALL_CORE
You can access the [email or web address], offered by [facility], by registering with the following website: [email or web address]

## 2019-03-09 NOTE — DISCHARGE NOTE PROVIDER - NSDCCPTREATMENT_GEN_ALL_CORE_FT
PRINCIPAL PROCEDURE  Procedure: Partial nephrectomy  Findings and Treatment:       SECONDARY PROCEDURE  Procedure: Left adrenalectomy  Findings and Treatment:

## 2019-03-09 NOTE — DISCHARGE NOTE PROVIDER - CARE PROVIDER_API CALL
Donis Powell)  Urology  [address], [facility], [place] [address]  Phone: [phone]  Fax: [phone]  Follow Up Time:

## 2019-03-09 NOTE — DISCHARGE NOTE PROVIDER - HOSPITAL COURSE
59yo female with a  5cm left adrenal mass and 3cm left renal mass . On 3/7/2019 patient underwent an uncompliacted Left partial nephrectomy and adrenalectomy.  Patient received one unit of PRBC post op.  Her recent CBC is stable

## 2019-03-09 NOTE — PROGRESS NOTE ADULT - SUBJECTIVE AND OBJECTIVE BOX
SUBJECTIVE:  Reports some residual pain. Tolerating diet, -N/V. +F/+BM. Denies CP/SOB. Received 3U pRBC over past 2 days, Hgb now 8.5 this AM.    Vital Signs Last 24 Hrs  T(C): 36.7 (09 Mar 2019 04:30), Max: 37.3 (08 Mar 2019 17:23)  T(F): 98.1 (09 Mar 2019 04:30), Max: 99.1 (08 Mar 2019 17:23)  HR: 84 (09 Mar 2019 08:35) (84 - 104)  BP: 126/59 (09 Mar 2019 08:35) (123/58 - 153/67)  BP(mean): 85 (09 Mar 2019 08:35) (83 - 96)  RR: 16 (09 Mar 2019 08:35) (16 - 18)  SpO2: 93% (09 Mar 2019 08:35) (92% - 98%)    Physical Exam:  General: NAD  Pulmonary: Nonlabored breathing, no respiratory distress  Abdominal: soft, appropriately tender, non-distended; incisions c/d/i  Neuro: A/O x3    I&O's Summary  08 Mar 2019 07:01  -  09 Mar 2019 07:00  --------------------------------------------------------  IN: 760 mL / OUT: 450 mL / NET: 310 mL    LABS:                     8.5    16.02 )-----------( 221      ( 09 Mar 2019 05:56 )             26.3     03-09  140  |  102  |  15  ----------------------------<  103<H>  4.1   |  29  |  0.65    Ca    9.0      09 Mar 2019 05:54  Phos  2.9     03-09  Mg     2.2     03-09    CAPILLARY BLOOD GLUCOSE  POCT Blood Glucose.: 113 mg/dL (09 Mar 2019 06:44)  POCT Blood Glucose.: 123 mg/dL (08 Mar 2019 22:07)  POCT Blood Glucose.: 108 mg/dL (08 Mar 2019 16:35)  POCT Blood Glucose.: 102 mg/dL (08 Mar 2019 11:28)

## 2019-03-09 NOTE — DISCHARGE NOTE PROVIDER - NSDCCPCAREPLAN_GEN_ALL_CORE_FT
PRINCIPAL DISCHARGE DIAGNOSIS  Problem: Left renal mass  Assessment and Plan of Treatment:       SECONDARY DISCHARGE DIAGNOSES  Problem: Left adrenal mass  Assessment and Plan of Treatment:

## 2019-03-09 NOTE — DISCHARGE NOTE NURSING/CASE MANAGEMENT/SOCIAL WORK - NSDCFUADDAPPT_GEN_ALL_CORE_FT
Please call Dr Powell's office  Monday March 11th, 2019 and make a one week follow up appointment to see him

## 2019-03-11 PROBLEM — R73.03 PREDIABETES: Chronic | Status: ACTIVE | Noted: 2019-03-04

## 2019-03-11 PROBLEM — H40.9 UNSPECIFIED GLAUCOMA: Chronic | Status: ACTIVE | Noted: 2019-02-18

## 2019-03-11 PROBLEM — E78.5 HYPERLIPIDEMIA, UNSPECIFIED: Chronic | Status: ACTIVE | Noted: 2019-02-18

## 2019-03-11 PROBLEM — I10 ESSENTIAL (PRIMARY) HYPERTENSION: Chronic | Status: ACTIVE | Noted: 2019-02-18

## 2019-03-11 PROBLEM — J45.909 UNSPECIFIED ASTHMA, UNCOMPLICATED: Chronic | Status: ACTIVE | Noted: 2019-03-04

## 2019-03-16 LAB
CORTICOSTEROID BINDING GLOBULIN RESULT: 1.5 MG/DL — LOW
CORTIS F/TOTAL MFR SERPL: 6.5 % — SIGNIFICANT CHANGE UP
CORTIS SERPL-MCNC: 5.5 UG/DL — SIGNIFICANT CHANGE UP
CORTISOL, FREE RESULT: 0.36 UG/DL — SIGNIFICANT CHANGE UP

## 2019-03-19 ENCOUNTER — INBOUND DOCUMENT (OUTPATIENT)
Age: 59
End: 2019-03-19

## 2019-03-19 DIAGNOSIS — I49.3 VENTRICULAR PREMATURE DEPOLARIZATION: ICD-10-CM

## 2019-03-19 DIAGNOSIS — D44.12 NEOPLASM OF UNCERTAIN BEHAVIOR OF LEFT ADRENAL GLAND: ICD-10-CM

## 2019-03-19 DIAGNOSIS — N28.89 OTHER SPECIFIED DISORDERS OF KIDNEY AND URETER: ICD-10-CM

## 2019-03-19 DIAGNOSIS — I10 ESSENTIAL (PRIMARY) HYPERTENSION: ICD-10-CM

## 2019-03-19 DIAGNOSIS — C64.2 MALIGNANT NEOPLASM OF LEFT KIDNEY, EXCEPT RENAL PELVIS: ICD-10-CM

## 2019-03-19 DIAGNOSIS — Z98.890 OTHER SPECIFIED POSTPROCEDURAL STATES: ICD-10-CM

## 2019-03-19 DIAGNOSIS — J45.909 UNSPECIFIED ASTHMA, UNCOMPLICATED: ICD-10-CM

## 2019-03-19 DIAGNOSIS — H40.9 UNSPECIFIED GLAUCOMA: ICD-10-CM

## 2019-03-19 DIAGNOSIS — E11.9 TYPE 2 DIABETES MELLITUS WITHOUT COMPLICATIONS: ICD-10-CM

## 2019-03-19 DIAGNOSIS — E66.9 OBESITY, UNSPECIFIED: ICD-10-CM

## 2019-03-19 DIAGNOSIS — K21.9 GASTRO-ESOPHAGEAL REFLUX DISEASE WITHOUT ESOPHAGITIS: ICD-10-CM

## 2019-03-19 DIAGNOSIS — Z90.711 ACQUIRED ABSENCE OF UTERUS WITH REMAINING CERVICAL STUMP: ICD-10-CM

## 2019-03-19 DIAGNOSIS — E78.5 HYPERLIPIDEMIA, UNSPECIFIED: ICD-10-CM

## 2019-04-01 ENCOUNTER — APPOINTMENT (OUTPATIENT)
Dept: UROLOGY | Facility: CLINIC | Age: 59
End: 2019-04-01
Payer: COMMERCIAL

## 2019-04-01 VITALS — TEMPERATURE: 98.4 F | HEART RATE: 84 BPM | DIASTOLIC BLOOD PRESSURE: 74 MMHG | SYSTOLIC BLOOD PRESSURE: 129 MMHG

## 2019-04-01 PROCEDURE — 99024 POSTOP FOLLOW-UP VISIT: CPT

## 2019-04-01 NOTE — LETTER BODY
[Dear  ___] : Dear  [unfilled], [Courtesy Letter:] : I had the pleasure of seeing your patient, [unfilled], in my office today. [Please see my note below.] : Please see my note below. [Consult Closing:] : Thank you very much for allowing me to participate in the care of this patient.  If you have any questions, please do not hesitate to contact me. [Sincerely,] : Sincerely, [FreeTextEntry2] : Neville Martin MD\par [address] [facility]\[place] [address] [FreeTextEntry3] : Donis Powell MD\par Urologic Oncology\par Department of Urology\par [facility]\par \par [employer] [facility]  [DrShaye  ___] : Dr. HELMS

## 2019-04-01 NOTE — ASSESSMENT
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 \par No adjuvant therapy indicated\par Will repeat imaging in 3 months\par Check labs today\par

## 2019-04-01 NOTE — PHYSICAL EXAM
[General Appearance - Well Developed] : well developed [General Appearance - Well Nourished] : well nourished [Normal Appearance] : normal appearance [Well Groomed] : well groomed [General Appearance - In No Acute Distress] : no acute distress [Abdomen Soft] : soft [Abdomen Tenderness] : non-tender [Costovertebral Angle Tenderness] : no ~M costovertebral angle tenderness [FreeTextEntry1] : incisions well healed [] : no respiratory distress [Respiration, Rhythm And Depth] : normal respiratory rhythm and effort [Exaggerated Use Of Accessory Muscles For Inspiration] : no accessory muscle use [Oriented To Time, Place, And Person] : oriented to person, place, and time [Affect] : the affect was normal [Mood] : the mood was normal [Not Anxious] : not anxious [Normal Station and Gait] : the gait and station were normal for the patient's age [No Focal Deficits] : no focal deficits

## 2019-04-01 NOTE — HISTORY OF PRESENT ILLNESS
[FreeTextEntry1] : This is a 59yo female referred by Dr. Bates for incidental left renal mass. This was found during workup of a 5cm left adrenal mass. The adrenal mass is reportedly non-functioning but she is scheduled for robotic adrenalectomy next week due to the large size. Also noted to have 3cm enhancing left renal mass. Here to discuss possible treatment of this mass at the time of adrenalectomy. No history of smoking or hematuria. \par \par 4/01/19 Here for postop visit. Underwent robotic left adrenalectomy, left partial nephrectomy 3/05/19, combined case with Dr. Elo Bates. Doing well since discharge, some constipation but improving. 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  [None] : no symptoms [Lower Back] : lower back

## 2019-04-02 LAB
ANION GAP SERPL CALC-SCNC: 10 MMOL/L
BACTERIA UR CULT: NORMAL
BASOPHILS # BLD AUTO: 0.05 K/UL
BASOPHILS NFR BLD AUTO: 0.4 %
BUN SERPL-MCNC: 25 MG/DL
CALCIUM SERPL-MCNC: 10.1 MG/DL
CHLORIDE SERPL-SCNC: 103 MMOL/L
CO2 SERPL-SCNC: 28 MMOL/L
CREAT SERPL-MCNC: 0.91 MG/DL
EOSINOPHIL # BLD AUTO: 0.41 K/UL
EOSINOPHIL NFR BLD AUTO: 3.2 %
GLUCOSE SERPL-MCNC: 121 MG/DL
HCT VFR BLD CALC: 36.3 %
HGB BLD-MCNC: 11.1 G/DL
IMM GRANULOCYTES NFR BLD AUTO: 0.2 %
LYMPHOCYTES # BLD AUTO: 3.58 K/UL
LYMPHOCYTES NFR BLD AUTO: 28.3 %
MAN DIFF?: NORMAL
MCHC RBC-ENTMCNC: 28.1 PG
MCHC RBC-ENTMCNC: 30.6 GM/DL
MCV RBC AUTO: 91.9 FL
MONOCYTES # BLD AUTO: 1.1 K/UL
MONOCYTES NFR BLD AUTO: 8.7 %
NEUTROPHILS # BLD AUTO: 7.49 K/UL
NEUTROPHILS NFR BLD AUTO: 59.2 %
PLATELET # BLD AUTO: 400 K/UL
POTASSIUM SERPL-SCNC: 5.1 MMOL/L
RBC # BLD: 3.95 M/UL
RBC # FLD: 15.3 %
SODIUM SERPL-SCNC: 141 MMOL/L
WBC # FLD AUTO: 12.65 K/UL

## 2019-06-30 ENCOUNTER — FORM ENCOUNTER (OUTPATIENT)
Age: 59
End: 2019-06-30

## 2019-07-01 ENCOUNTER — APPOINTMENT (OUTPATIENT)
Dept: UROLOGY | Facility: CLINIC | Age: 59
End: 2019-07-01
Payer: COMMERCIAL

## 2019-07-01 ENCOUNTER — OUTPATIENT (OUTPATIENT)
Dept: OUTPATIENT SERVICES | Facility: HOSPITAL | Age: 59
LOS: 1 days | End: 2019-07-01
Payer: COMMERCIAL

## 2019-07-01 VITALS — HEART RATE: 87 BPM | TEMPERATURE: 99 F | SYSTOLIC BLOOD PRESSURE: 124 MMHG | DIASTOLIC BLOOD PRESSURE: 81 MMHG

## 2019-07-01 DIAGNOSIS — Z98.890 OTHER SPECIFIED POSTPROCEDURAL STATES: Chronic | ICD-10-CM

## 2019-07-01 DIAGNOSIS — C74.02: ICD-10-CM

## 2019-07-01 DIAGNOSIS — Z90.711 ACQUIRED ABSENCE OF UTERUS WITH REMAINING CERVICAL STUMP: Chronic | ICD-10-CM

## 2019-07-01 DIAGNOSIS — Z98.891 HISTORY OF UTERINE SCAR FROM PREVIOUS SURGERY: Chronic | ICD-10-CM

## 2019-07-01 PROCEDURE — 99214 OFFICE O/P EST MOD 30 MIN: CPT | Mod: 57

## 2019-07-01 PROCEDURE — 71046 X-RAY EXAM CHEST 2 VIEWS: CPT | Mod: 26

## 2019-07-01 PROCEDURE — 71046 X-RAY EXAM CHEST 2 VIEWS: CPT

## 2019-07-02 LAB
ALBUMIN SERPL ELPH-MCNC: 4.5 G/DL
ALP BLD-CCNC: 108 U/L
ALT SERPL-CCNC: 13 U/L
ANION GAP SERPL CALC-SCNC: 13 MMOL/L
APPEARANCE: ABNORMAL
APTT BLD: 33.5 SEC
AST SERPL-CCNC: 11 U/L
BACTERIA: ABNORMAL
BASOPHILS # BLD AUTO: 0.05 K/UL
BASOPHILS NFR BLD AUTO: 0.4 %
BILIRUB SERPL-MCNC: 0.3 MG/DL
BILIRUBIN URINE: NEGATIVE
BLOOD URINE: NEGATIVE
BUN SERPL-MCNC: 21 MG/DL
CALCIUM OXALATE CRYSTALS: ABNORMAL
CALCIUM SERPL-MCNC: 10.1 MG/DL
CHLORIDE SERPL-SCNC: 101 MMOL/L
CO2 SERPL-SCNC: 26 MMOL/L
COLOR: YELLOW
CREAT SERPL-MCNC: 1.06 MG/DL
EOSINOPHIL # BLD AUTO: 0.24 K/UL
EOSINOPHIL NFR BLD AUTO: 1.9 %
GLUCOSE QUALITATIVE U: NEGATIVE
GLUCOSE SERPL-MCNC: 95 MG/DL
HCT VFR BLD CALC: 39.1 %
HGB BLD-MCNC: 12.2 G/DL
HYALINE CASTS: 2 /LPF
IMM GRANULOCYTES NFR BLD AUTO: 0.2 %
INR PPP: 0.97 RATIO
KETONES URINE: NEGATIVE
LEUKOCYTE ESTERASE URINE: ABNORMAL
LYMPHOCYTES # BLD AUTO: 2.87 K/UL
LYMPHOCYTES NFR BLD AUTO: 22.6 %
MAN DIFF?: NORMAL
MCHC RBC-ENTMCNC: 28 PG
MCHC RBC-ENTMCNC: 31.2 GM/DL
MCV RBC AUTO: 89.9 FL
MICROSCOPIC-UA: NORMAL
MONOCYTES # BLD AUTO: 0.84 K/UL
MONOCYTES NFR BLD AUTO: 6.6 %
NEUTROPHILS # BLD AUTO: 8.67 K/UL
NEUTROPHILS NFR BLD AUTO: 68.3 %
NITRITE URINE: NEGATIVE
PH URINE: 6
PLATELET # BLD AUTO: 361 K/UL
POTASSIUM SERPL-SCNC: 4.8 MMOL/L
PROT SERPL-MCNC: 7.3 G/DL
PROTEIN URINE: ABNORMAL
PT BLD: 11 SEC
RBC # BLD: 4.35 M/UL
RBC # FLD: 13.7 %
RED BLOOD CELLS URINE: 8 /HPF
SODIUM SERPL-SCNC: 140 MMOL/L
SPECIFIC GRAVITY URINE: 1.02
SQUAMOUS EPITHELIAL CELLS: 11 /HPF
URINE COMMENTS: NORMAL
UROBILINOGEN URINE: NORMAL
WBC # FLD AUTO: 12.7 K/UL
WHITE BLOOD CELLS URINE: 9 /HPF

## 2019-07-02 NOTE — ASSESSMENT
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 \par Imaging in 5/2019 negative for recurrence but 8mm right kidney stone which was in proximal right ureter in April\par Reviewed CT findings\par Recommend right ureteroscopy, laser lithotripsy, right ureteral stent placement \par Reviewed procedure, indications, risks and benefits.\par

## 2019-07-02 NOTE — LETTER BODY
[Courtesy Letter:] : I had the pleasure of seeing your patient, [unfilled], in my office today. [Dear  ___] : Dear  [unfilled], [Consult Closing:] : Thank you very much for allowing me to participate in the care of this patient.  If you have any questions, please do not hesitate to contact me. [Please see my note below.] : Please see my note below. [Sincerely,] : Sincerely, [FreeTextEntry2] : Neville Martin MD\par [address] [facility]\[place] [address] [DrShaye  ___] : Dr. HELMS [FreeTextEntry3] : Donis Powell MD\par Urologic Oncology\par Department of Urology\par [facility]\par \par [employer] [facility]

## 2019-07-02 NOTE — HISTORY OF PRESENT ILLNESS
[FreeTextEntry1] : This is a 59yo female referred by Dr. Bates for incidental left renal mass. This was found during workup of a 5cm left adrenal mass. The adrenal mass is reportedly non-functioning but she is scheduled for robotic adrenalectomy next week due to the large size. Also noted to have 3cm enhancing left renal mass. Here to discuss possible treatment of this mass at the time of adrenalectomy. No history of smoking or hematuria. \par \par 4/01/19 Here for postop visit. Underwent robotic left adrenalectomy, left partial nephrectomy 3/05/19, combined case with Dr. Elo Bates. Doing well since discharge, some constipation but improving. 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 \par \par 7/01/19 Here for f/u. Had 8mm right proximal ureteral stone identified in April. Recent repeat imaging shows the stone moved to renal pelvis and is no longer causing hydronephrosis or pain. No recurrence noted in left kidney or left adrenal fossa.  [Lower Back] : lower back [None] : None

## 2019-07-02 NOTE — PHYSICAL EXAM
[General Appearance - Well Developed] : well developed [General Appearance - Well Nourished] : well nourished [Normal Appearance] : normal appearance [Well Groomed] : well groomed [Abdomen Soft] : soft [General Appearance - In No Acute Distress] : no acute distress [Costovertebral Angle Tenderness] : no ~M costovertebral angle tenderness [FreeTextEntry1] : incisions well healed [Abdomen Tenderness] : non-tender [Respiration, Rhythm And Depth] : normal respiratory rhythm and effort [Exaggerated Use Of Accessory Muscles For Inspiration] : no accessory muscle use [] : no respiratory distress [Affect] : the affect was normal [Oriented To Time, Place, And Person] : oriented to person, place, and time [Mood] : the mood was normal [Not Anxious] : not anxious [No Focal Deficits] : no focal deficits [Normal Station and Gait] : the gait and station were normal for the patient's age

## 2019-07-03 LAB — BACTERIA UR CULT: NORMAL

## 2019-07-16 ENCOUNTER — APPOINTMENT (OUTPATIENT)
Dept: UROLOGY | Facility: AMBULATORY SURGERY CENTER | Age: 59
End: 2019-07-16

## 2019-07-16 ENCOUNTER — OUTPATIENT (OUTPATIENT)
Dept: OUTPATIENT SERVICES | Facility: HOSPITAL | Age: 59
LOS: 1 days | Discharge: HOME | End: 2019-07-16
Payer: MEDICAID

## 2019-07-16 ENCOUNTER — RESULT REVIEW (OUTPATIENT)
Age: 59
End: 2019-07-16

## 2019-07-16 DIAGNOSIS — Z98.890 OTHER SPECIFIED POSTPROCEDURAL STATES: Chronic | ICD-10-CM

## 2019-07-16 DIAGNOSIS — Z90.711 ACQUIRED ABSENCE OF UTERUS WITH REMAINING CERVICAL STUMP: Chronic | ICD-10-CM

## 2019-07-16 DIAGNOSIS — Z98.891 HISTORY OF UTERINE SCAR FROM PREVIOUS SURGERY: Chronic | ICD-10-CM

## 2019-07-16 LAB — GLUCOSE BLDC GLUCOMTR-MCNC: 116 MG/DL — HIGH (ref 70–99)

## 2019-07-16 PROCEDURE — 52356 CYSTO/URETERO W/LITHOTRIPSY: CPT | Mod: RT

## 2019-07-16 RX ORDER — IBUPROFEN 200 MG
1 TABLET ORAL
Qty: 12 | Refills: 0
Start: 2019-07-16 | End: 2019-07-19

## 2019-07-16 RX ORDER — OXYBUTYNIN CHLORIDE 5 MG
1 TABLET ORAL
Qty: 15 | Refills: 0
Start: 2019-07-16 | End: 2019-07-20

## 2019-07-16 RX ORDER — TAMSULOSIN HYDROCHLORIDE 0.4 MG/1
1 CAPSULE ORAL
Qty: 14 | Refills: 0
Start: 2019-07-16 | End: 2019-07-29

## 2019-07-18 LAB — SURGICAL PATHOLOGY STUDY: SIGNIFICANT CHANGE UP

## 2019-07-21 LAB
-  ERYTHROMYCIN: SIGNIFICANT CHANGE UP
-  PENICILLIN: SIGNIFICANT CHANGE UP
-  VANCOMYCIN: SIGNIFICANT CHANGE UP
METHOD TYPE: SIGNIFICANT CHANGE UP
METHOD TYPE: SIGNIFICANT CHANGE UP

## 2019-07-22 LAB
-  CEFTRIAXONE: SIGNIFICANT CHANGE UP
COMPN STONE: SIGNIFICANT CHANGE UP
CULTURE RESULTS: SIGNIFICANT CHANGE UP
ORGANISM # SPEC MICROSCOPIC CNT: SIGNIFICANT CHANGE UP
SPECIMEN SOURCE: SIGNIFICANT CHANGE UP

## 2019-07-29 ENCOUNTER — APPOINTMENT (OUTPATIENT)
Dept: UROLOGY | Facility: CLINIC | Age: 59
End: 2019-07-29
Payer: COMMERCIAL

## 2019-07-29 VITALS
WEIGHT: 195 LBS | HEIGHT: 65 IN | HEART RATE: 83 BPM | OXYGEN SATURATION: 98 % | BODY MASS INDEX: 32.49 KG/M2 | TEMPERATURE: 99.1 F | DIASTOLIC BLOOD PRESSURE: 80 MMHG | SYSTOLIC BLOOD PRESSURE: 122 MMHG

## 2019-07-29 PROCEDURE — 52310 CYSTOSCOPY AND TREATMENT: CPT

## 2019-08-28 ENCOUNTER — APPOINTMENT (OUTPATIENT)
Dept: UROLOGY | Facility: CLINIC | Age: 59
End: 2019-08-28
Payer: COMMERCIAL

## 2019-08-28 VITALS — DIASTOLIC BLOOD PRESSURE: 80 MMHG | HEART RATE: 73 BPM | TEMPERATURE: 99 F | SYSTOLIC BLOOD PRESSURE: 123 MMHG

## 2019-08-28 DIAGNOSIS — N20.0 CALCULUS OF KIDNEY: ICD-10-CM

## 2019-08-28 PROCEDURE — 99213 OFFICE O/P EST LOW 20 MIN: CPT

## 2019-08-29 NOTE — ASSESSMENT
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 \par 8mm right kidney stone s/p ight ureteroscopy, laser lithotripsy, right ureteral stent placement \par Reviewed stone composition today\par Reviewed dietary precautions\par Check renal US\par F/u 6 months or sooner prn\par

## 2019-08-29 NOTE — PHYSICAL EXAM
[General Appearance - Well Developed] : well developed [Normal Appearance] : normal appearance [General Appearance - Well Nourished] : well nourished [General Appearance - In No Acute Distress] : no acute distress [Well Groomed] : well groomed [Abdomen Soft] : soft [Abdomen Tenderness] : non-tender [FreeTextEntry1] : incisions well healed [Costovertebral Angle Tenderness] : no ~M costovertebral angle tenderness [Affect] : the affect was normal [Oriented To Time, Place, And Person] : oriented to person, place, and time [Mood] : the mood was normal [Normal Station and Gait] : the gait and station were normal for the patient's age [Not Anxious] : not anxious [No Focal Deficits] : no focal deficits

## 2019-08-29 NOTE — HISTORY OF PRESENT ILLNESS
[FreeTextEntry1] : This is a 57yo female referred by Dr. Bates for incidental left renal mass. This was found during workup of a 5cm left adrenal mass. The adrenal mass is reportedly non-functioning but she is scheduled for robotic adrenalectomy next week due to the large size. Also noted to have 3cm enhancing left renal mass. Here to discuss possible treatment of this mass at the time of adrenalectomy. No history of smoking or hematuria. \par \par 4/01/19 Here for postop visit. Underwent robotic left adrenalectomy, left partial nephrectomy 3/05/19, combined case with Dr. Elo Bates. Doing well since discharge, some constipation but improving. 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 \par \par 7/01/19 Here for f/u. Had 8mm right proximal ureteral stone identified in April. Recent repeat imaging shows the stone moved to renal pelvis and is no longer causing hydronephrosis or pain. No recurrence noted in left kidney or left adrenal fossa. \par \par 8/28/19 Here for f/u. s/p lithotripsy. Stent removed 7/29. Stone composition was mixed calcium. Feeling well, no complaints.  [Lower Back] : lower back [None] : None

## 2019-08-29 NOTE — LETTER BODY
[Courtesy Letter:] : I had the pleasure of seeing your patient, [unfilled], in my office today. [Dear  ___] : Dear  [unfilled], [Please see my note below.] : Please see my note below. [Consult Closing:] : Thank you very much for allowing me to participate in the care of this patient.  If you have any questions, please do not hesitate to contact me. [FreeTextEntry2] : Neville Martin MD\par [address] [facility]\[place] [address] [Sincerely,] : Sincerely, [FreeTextEntry3] : Donis Powell MD\par Urologic Oncology\par Department of Urology\par [facility]\par \par [employer] [facility]  [DrShaye  ___] : Dr. HELMS

## 2020-01-22 NOTE — BRIEF OPERATIVE NOTE - IV INFUSIONS - CRYSTALLOIDS
Patient ID: Sharita Kuo is a 34 year old female.     Chief complaint: had concerns including Medication Management (feeling down and more anxious since this weekend. feels like medication is not working as well).    Here for medication check for her anxiety. Patient under family and social pressures more than usual recently. Family is not helping. Patient seeing her therapist. Patient feels her medication is not working as well. No chest pain or sob. No thoughts of hurting her self or others.     Review of Systems  See above.     ALLERGIES:  Contrast media    Patient's past medical, surgical, social and family histories were reviewed and updated as appropriate.    Outpatient Medications Marked as Taking for the 1/22/20 encounter (Office Visit) with Dwight Handley, DO   Medication Sig Dispense Refill   • escitalopram (LEXAPRO) 10 MG tablet Take 2 tablets by mouth daily. 60 tablet 0   • VITAMIN D, CHOLECALCIFEROL, PO Take 1,000 Units by mouth daily.         Vitals:  Blood pressure 102/64, pulse 78, height 5' 3\" (1.6 m), weight 49 kg (108 lb), last menstrual period 12/23/2019. Body mass index is 19.13 kg/m².    Physical Exam  Vitals signs and nursing note reviewed.   Constitutional:       Appearance: Normal appearance. She is well-developed.   HENT:      Head: Normocephalic and atraumatic.   Neck:      Musculoskeletal: Normal range of motion.   Cardiovascular:      Rate and Rhythm: Normal rate and regular rhythm.      Heart sounds: Normal heart sounds.   Pulmonary:      Effort: Pulmonary effort is normal. No respiratory distress.      Breath sounds: Normal breath sounds.   Neurological:      Mental Status: She is alert.   Psychiatric:         Mood and Affect: Mood normal.         Behavior: Behavior normal.         Thought Content: Thought content normal.         Judgment: Judgment normal.         Problem List Items Addressed This Visit        Behavioral    Anxiety state - Primary    Relevant Medications    escitalopram  (LEXAPRO) 10 MG tablet       Digestive    Vitamin D deficiency    Relevant Orders    VITAMIN D -25 HYDROXY        Increased the lexapro to 20 mg once daily. To call if symptoms worsening.     Follow up: Return in about 4 weeks (around 2/19/2020) for Medication check.    Discussed medication dosage, usage, goals of therapy, and side effects.    The patient indicates understanding of these issues and agrees with the plan.    Dwight Handley, DO   [id number]

## 2020-01-27 ENCOUNTER — RESULT REVIEW (OUTPATIENT)
Age: 60
End: 2020-01-27

## 2020-02-01 ENCOUNTER — FORM ENCOUNTER (OUTPATIENT)
Age: 60
End: 2020-02-01

## 2020-02-02 ENCOUNTER — APPOINTMENT (OUTPATIENT)
Dept: CT IMAGING | Facility: HOSPITAL | Age: 60
End: 2020-02-02
Payer: MEDICAID

## 2020-02-02 ENCOUNTER — OUTPATIENT (OUTPATIENT)
Dept: OUTPATIENT SERVICES | Facility: HOSPITAL | Age: 60
LOS: 1 days | End: 2020-02-02
Payer: MEDICAID

## 2020-02-02 DIAGNOSIS — Z98.890 OTHER SPECIFIED POSTPROCEDURAL STATES: Chronic | ICD-10-CM

## 2020-02-02 DIAGNOSIS — Z98.891 HISTORY OF UTERINE SCAR FROM PREVIOUS SURGERY: Chronic | ICD-10-CM

## 2020-02-02 DIAGNOSIS — Z90.711 ACQUIRED ABSENCE OF UTERUS WITH REMAINING CERVICAL STUMP: Chronic | ICD-10-CM

## 2020-02-02 PROCEDURE — 74176 CT ABD & PELVIS W/O CONTRAST: CPT | Mod: 26

## 2020-02-02 PROCEDURE — 74176 CT ABD & PELVIS W/O CONTRAST: CPT

## 2020-02-05 ENCOUNTER — APPOINTMENT (OUTPATIENT)
Dept: UROLOGY | Facility: CLINIC | Age: 60
End: 2020-02-05
Payer: MEDICAID

## 2020-02-05 VITALS — DIASTOLIC BLOOD PRESSURE: 84 MMHG | TEMPERATURE: 98.5 F | HEART RATE: 72 BPM | SYSTOLIC BLOOD PRESSURE: 136 MMHG

## 2020-02-05 DIAGNOSIS — N20.1 CALCULUS OF URETER: ICD-10-CM

## 2020-02-05 DIAGNOSIS — N13.30 UNSPECIFIED HYDRONEPHROSIS: ICD-10-CM

## 2020-02-05 PROCEDURE — 99213 OFFICE O/P EST LOW 20 MIN: CPT

## 2020-02-05 NOTE — ASSESSMENT
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 \par 8mm right kidney stone s/p ight ureteroscopy, laser lithotripsy, right ureteral stent placement 7/2019\par New left hydronephrosis due to new 4mm left proximal ureteral stone and 9mm left lower pole stone\par Asymptomatic\par Recommend observation for short period\par RTC 3 weeks for repeat US. If still with hydro, would recommend elective left ureteroscopy \par

## 2020-02-05 NOTE — PHYSICAL EXAM
[General Appearance - Well Nourished] : well nourished [General Appearance - Well Developed] : well developed [General Appearance - In No Acute Distress] : no acute distress [Normal Appearance] : normal appearance [Well Groomed] : well groomed [Abdomen Tenderness] : non-tender [Costovertebral Angle Tenderness] : no ~M costovertebral angle tenderness [Abdomen Soft] : soft [FreeTextEntry1] : incisions well healed [Oriented To Time, Place, And Person] : oriented to person, place, and time [Affect] : the affect was normal [Mood] : the mood was normal [Not Anxious] : not anxious [Normal Station and Gait] : the gait and station were normal for the patient's age [No Focal Deficits] : no focal deficits

## 2020-02-05 NOTE — HISTORY OF PRESENT ILLNESS
[FreeTextEntry1] : This is a 59yo female referred by Dr. Bates for incidental left renal mass. This was found during workup of a 5cm left adrenal mass. The adrenal mass is reportedly non-functioning but she is scheduled for robotic adrenalectomy next week due to the large size. Also noted to have 3cm enhancing left renal mass. Here to discuss possible treatment of this mass at the time of adrenalectomy. No history of smoking or hematuria. \par \par 4/01/19 Here for postop visit. Underwent robotic left adrenalectomy, left partial nephrectomy 3/05/19, combined case with Dr. Elo Bates. Doing well since discharge, some constipation but improving. 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 \par \par 7/01/19 Here for f/u. Had 8mm right proximal ureteral stone identified in April. Recent repeat imaging shows the stone moved to renal pelvis and is no longer causing hydronephrosis or pain. No recurrence noted in left kidney or left adrenal fossa. \par \par 8/28/19 Here for f/u. s/p lithotripsy. Stent removed 7/29. Stone composition was mixed calcium. Feeling well, no complaints. \par \par 2/05/20 Here for f/u. Had outside US performed in January which showed new left hydronephrosis. Underwent CT scan 3 days ago which shows a 9mm left lower pole stone and 4mm left proximal ureteral stone causing hydronephrosis. She has no symptoms.  [None] : None [Lower Back] : lower back

## 2020-02-05 NOTE — LETTER BODY
[Dear  ___] : Dear  [unfilled], [Courtesy Letter:] : I had the pleasure of seeing your patient, [unfilled], in my office today. [Consult Closing:] : Thank you very much for allowing me to participate in the care of this patient.  If you have any questions, please do not hesitate to contact me. [Sincerely,] : Sincerely, [Please see my note below.] : Please see my note below. [FreeTextEntry2] : Neville Martin MD\par [address] [facility]\[place] [address] [FreeTextEntry3] : Donis Powell MD\par Urologic Oncology\par Department of Urology\par [facility]\par \par [employer] [facility]

## 2020-03-09 ENCOUNTER — APPOINTMENT (OUTPATIENT)
Dept: UROLOGY | Facility: CLINIC | Age: 60
End: 2020-03-09

## 2020-09-23 ENCOUNTER — APPOINTMENT (OUTPATIENT)
Dept: UROLOGY | Facility: CLINIC | Age: 60
End: 2020-09-23
Payer: MEDICAID

## 2020-09-23 VITALS — TEMPERATURE: 98 F | DIASTOLIC BLOOD PRESSURE: 78 MMHG | SYSTOLIC BLOOD PRESSURE: 131 MMHG | HEART RATE: 69 BPM

## 2020-09-23 DIAGNOSIS — C64.2 MALIGNANT NEOPLASM OF LEFT KIDNEY, EXCEPT RENAL PELVIS: ICD-10-CM

## 2020-09-23 DIAGNOSIS — N20.0 CALCULUS OF KIDNEY: ICD-10-CM

## 2020-09-23 PROCEDURE — 99213 OFFICE O/P EST LOW 20 MIN: CPT

## 2020-09-24 NOTE — HISTORY OF PRESENT ILLNESS
[FreeTextEntry1] : This is a 57yo female referred by Dr. Bates for incidental left renal mass. This was found during workup of a 5cm left adrenal mass. The adrenal mass is reportedly non-functioning but she is scheduled for robotic adrenalectomy next week due to the large size. Also noted to have 3cm enhancing left renal mass. Here to discuss possible treatment of this mass at the time of adrenalectomy. No history of smoking or hematuria. \par \par 4/01/19 Here for postop visit. Underwent robotic left adrenalectomy, left partial nephrectomy 3/05/19, combined case with Dr. Elo Bates. Doing well since discharge, some constipation but improving. 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 \par \par 7/01/19 Here for f/u. Had 8mm right proximal ureteral stone identified in April. Recent repeat imaging shows the stone moved to renal pelvis and is no longer causing hydronephrosis or pain. No recurrence noted in left kidney or left adrenal fossa. \par \par 8/28/19 Here for f/u. s/p lithotripsy. Stent removed 7/29. Stone composition was mixed calcium. Feeling well, no complaints. \par \par 2/05/20 Here for f/u. Had outside US performed in January which showed new left hydronephrosis. Underwent CT scan 3 days ago which shows a 9mm left lower pole stone and 4mm left proximal ureteral stone causing hydronephrosis. She has no symptoms. \par \par 9/23/20 Here for f/u. Doing well. Recent labs reviewed, cr < 1, UA negative. No complaints.  [None] : None [Lower Back] : lower back

## 2020-09-24 NOTE — LETTER BODY
[Dear  ___] : Dear  [unfilled], [Courtesy Letter:] : I had the pleasure of seeing your patient, [unfilled], in my office today. [Please see my note below.] : Please see my note below. [Consult Closing:] : Thank you very much for allowing me to participate in the care of this patient.  If you have any questions, please do not hesitate to contact me. [Sincerely,] : Sincerely, [FreeTextEntry2] : Neville Martin MD\par [address] [facility]\[place] [address] [FreeTextEntry3] : Donis Powell MD\par Urologic Oncology\par Department of Urology\par [facility]\par \par [employer] [facility]

## 2020-09-24 NOTE — ASSESSMENT
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 \par History of nephrolithiasis s/p lithotripsy\par Currently asymptomatic\par Check renal US, CXR\par F/u 1 year pending results

## 2020-09-24 NOTE — PHYSICAL EXAM
[General Appearance - Well Developed] : well developed [General Appearance - Well Nourished] : well nourished [Normal Appearance] : normal appearance [Well Groomed] : well groomed [General Appearance - In No Acute Distress] : no acute distress [Abdomen Soft] : soft [Abdomen Tenderness] : non-tender [Costovertebral Angle Tenderness] : no ~M costovertebral angle tenderness [FreeTextEntry1] : incisions well healed [Oriented To Time, Place, And Person] : oriented to person, place, and time [Affect] : the affect was normal [Mood] : the mood was normal [Not Anxious] : not anxious [Normal Station and Gait] : the gait and station were normal for the patient's age [No Focal Deficits] : no focal deficits

## 2020-11-11 NOTE — H&P ADULT - NSHPPOAPULMEMBOLUS_GEN_A_CORE
Patient called and wanted to know if she can get in her last vein procedure in December.    Please call to discuss,    Thank you,      
Patient scheduled for procedure.  
no

## 2021-04-14 ENCOUNTER — APPOINTMENT (OUTPATIENT)
Dept: UROLOGY | Facility: CLINIC | Age: 61
End: 2021-04-14

## 2021-09-22 ENCOUNTER — APPOINTMENT (OUTPATIENT)
Dept: UROLOGY | Facility: CLINIC | Age: 61
End: 2021-09-22